# Patient Record
Sex: FEMALE | Race: WHITE | NOT HISPANIC OR LATINO | Employment: UNEMPLOYED | URBAN - METROPOLITAN AREA
[De-identification: names, ages, dates, MRNs, and addresses within clinical notes are randomized per-mention and may not be internally consistent; named-entity substitution may affect disease eponyms.]

---

## 2017-01-27 ENCOUNTER — PROCEDURE VISIT (OUTPATIENT)
Dept: OBSTETRICS AND GYNECOLOGY | Age: 22
End: 2017-01-27

## 2017-01-27 ENCOUNTER — OFFICE VISIT (OUTPATIENT)
Dept: OBSTETRICS AND GYNECOLOGY | Age: 22
End: 2017-01-27

## 2017-01-27 VITALS
BODY MASS INDEX: 20.14 KG/M2 | SYSTOLIC BLOOD PRESSURE: 118 MMHG | WEIGHT: 118 LBS | DIASTOLIC BLOOD PRESSURE: 78 MMHG | HEIGHT: 64 IN

## 2017-01-27 DIAGNOSIS — Z3A.01 7 WEEKS GESTATION OF PREGNANCY: Primary | ICD-10-CM

## 2017-01-27 DIAGNOSIS — Z3A.01 LESS THAN 8 WEEKS GESTATION OF PREGNANCY: Primary | ICD-10-CM

## 2017-01-27 DIAGNOSIS — Z34.81 PRENATAL CARE, SUBSEQUENT PREGNANCY, FIRST TRIMESTER: ICD-10-CM

## 2017-01-27 DIAGNOSIS — O36.80X0 PREGNANCY WITH INCONCLUSIVE FETAL VIABILITY, NOT APPLICABLE OR UNSPECIFIED FETUS: ICD-10-CM

## 2017-01-27 DIAGNOSIS — Z23 NEED FOR IMMUNIZATION AGAINST INFLUENZA: ICD-10-CM

## 2017-01-27 PROBLEM — Z82.79 FAMILY HISTORY OF CONGENITAL HEART DEFECT: Status: ACTIVE | Noted: 2017-01-27

## 2017-01-27 PROBLEM — O34.219 PREVIOUS CESAREAN DELIVERY AFFECTING PREGNANCY: Status: ACTIVE | Noted: 2017-01-27

## 2017-01-27 PROBLEM — Z14.1 CYSTIC FIBROSIS CARRIER: Status: ACTIVE | Noted: 2017-01-27

## 2017-01-27 LAB
EXTERNAL GC/CHLAMYDIA: NORMAL
EXTERNAL RUBELLA QUALITATIVE: (no result)
EXTERNAL URINE CULTURE: NO GROWTH
HIV1 AB SPEC QL IA.RAPID: NEGATIVE
VZV IGG SER QL: NORMAL

## 2017-01-27 PROCEDURE — 90471 IMMUNIZATION ADMIN: CPT | Performed by: OBSTETRICS & GYNECOLOGY

## 2017-01-27 PROCEDURE — 81002 URINALYSIS NONAUTO W/O SCOPE: CPT | Performed by: OBSTETRICS & GYNECOLOGY

## 2017-01-27 PROCEDURE — 76817 TRANSVAGINAL US OBSTETRIC: CPT | Performed by: OBSTETRICS & GYNECOLOGY

## 2017-01-27 PROCEDURE — 90656 IIV3 VACC NO PRSV 0.5 ML IM: CPT | Performed by: OBSTETRICS & GYNECOLOGY

## 2017-01-27 PROCEDURE — 99213 OFFICE O/P EST LOW 20 MIN: CPT | Performed by: OBSTETRICS & GYNECOLOGY

## 2017-01-27 RX ORDER — PNV NO.95/FERROUS FUM/FOLIC AC 28MG-0.8MG
1 TABLET ORAL DAILY
Qty: 30 TABLET | Refills: 12 | Status: SHIPPED | OUTPATIENT
Start: 2017-01-27 | End: 2017-09-10 | Stop reason: HOSPADM

## 2017-01-27 NOTE — PROGRESS NOTES
"Subjective     Chief Complaint   Patient presents with   • Follow-up     pregnancy confirmation       Mary Chandler is a 21 y.o.  whose LMP is Patient's last menstrual period was 2016 (exact date). presents with +pregnancy test, needs prenatal vitamins. No N/V. Plans repeat C/S. Pt is a CF carrier w/ same FOB who was not tested last pregnancy (he does not have insurance). Pt w/ irregular menses.      No Additional Complaints Reported    The following portions of the patient's history were reviewed and updated as appropriate:vital signs, allergies, current medications, past family history, past medical history, past social history, past surgical history and problem list      Review of Systems   Pertinent items are noted in HPI.     Objective        Visit Vitals   • /78   • Ht 64\" (162.6 cm)   • Wt 118 lb (53.5 kg)   • LMP 2016 (Exact Date)   • Breastfeeding No   • BMI 20.25 kg/m2       Physical Exam    General:   alert, appears stated age and mild distress   Heart: regular rate and rhythm, S1, S2 normal, no murmur, click, rub or gallop   Lungs: clear to auscultation bilaterally   Breast: Not performed today   Neck: thyroid not enlarged, symmetric, no tenderness/mass/nodules   Abdomen: {soft, non-tender. Bowel sounds normal. No masses,  no organomegaly   CVA: absent   Pelvis: Not performed today   Extremities: Extremities normal, atraumatic, no cyanosis or edema   Neurologic: negative   Psychiatric: Normal affect, judgement, and mood       Lab Review   Labs: No data reviewed     Imaging   Ultrasound - Pelvic Vaginal  US-pacheco IUP +cardiac activity, CRL=7 1/7wk gest, EDC=17    Assessment/Plan     ASSESSMENT  1. Less than 8 weeks gestation of pregnancy    2. Need for immunization against influenza        PLAN  1.   Orders Placed This Encounter   Procedures   • Urine Culture   • C Trachomatis / N Gonorrhoeae PCR   • Flu Vaccine Greater Than or Equal To 2yo Preservative Free IM   • OB " Panel With HIV       2. Medications prescribed this encounter:        New Medications Ordered This Visit   Medications   • Prenatal Vit-Fe Fumarate-FA (PRENATAL VITAMIN) 27-0.8 MG tablet     Sig: Take 1 tablet by mouth Daily.     Dispense:  30 tablet     Refill:  12       3. Use US EDC of 9/14/17  4 will refer to peds cardiology for fetal echo at 24 wks  5 desires cell free DNA and Essential panel w/ NxGen, info given  Follow up: 4 week(s)    Marine Peterson MD  1/27/2017

## 2017-01-27 NOTE — MR AVS SNAPSHOT
Mary Chandler   2017 8:15 AM   Procedure visit    Dept Phone:  [phone]   Encounter #:  [id number]    Provider:  [facility]   Department:  [facility]                Your Full Care Plan              Your Updated Medication List      Notice  As of 2017  9:10 AM    You have not been prescribed any medications.            We Performed the Following     US Ob Transvaginal       You Were Diagnosed With        Codes Comments    7 weeks gestation of pregnancy    -  Primary ICD-10-CM: Z3A.01  ICD-9-CM: V22.2     Pregnancy with inconclusive fetal viability, not applicable or unspecified fetus     ICD-10-CM: O36.80X0  ICD-9-CM: V23.87     Prenatal care, subsequent pregnancy, first trimester     ICD-10-CM: Z34.81  ICD-9-CM: V22.1       Instructions     None    Patient Instructions History      Upcoming Appointments     Visit Type Date Time Department    GYN FOLLOW UP 2017  8:30 AM [facility]    ULTRASOUND 2017  8:15 AM [facility]    OB FOLLOWUP 2017 10:45 AM [facility]      MyChart Signup     [facility] [employer] allows you to send messages to your doctor, view your test results, renew your prescriptions, schedule appointments, and more. To sign up, go to [email or web address] and click on the Sign Up Now link in the New User? box. Enter your [employer] Activation Code exactly as it appears below along with the last four digits of your Social Security Number and your Date of Birth () to complete the sign-up process. If you do not sign up before the expiration date, you must request a new code.    [employer] Activation Code: [email or web address]  Expires: 2/10/2017  9:10 AM    If you have questions, you can email [email or web address] or call [phone] to talk to our [employer] staff. Remember, [employer] is NOT to be used for urgent needs. For medical emergencies, dial 911.               Other Info  from Your Visit           Your Appointments     Feb 24, 2017 10:45 AM EST   OB FOLLOWUP with Marine Peterson MD   [facility] (--)    [address] [place] [address]   [phone]              Allergies     No Known Allergies      Vital Signs     Last Menstrual Period Smoking Status                12/03/2016 (Exact Date) Never Smoker          Problems and Diagnoses Noted     7 weeks gestation of pregnancy    -  Primary    Pregnancy with inconclusive fetal viability, not applicable or unspecified fetus        Prenatal care

## 2017-01-27 NOTE — MR AVS SNAPSHOT
Mary Lainezy   2017 8:30 AM   Office Visit    Dept Phone:  [phone]   Encounter #:  [id number]    Provider:  Marine Peterson MD   Department:  [facility]                Your Full Care Plan              Your Updated Medication List      Notice  As of 2017  9:10 AM    You have not been prescribed any medications.            We Performed the Following     C Trachomatis / N Gonorrhoeae PCR     Flu Vaccine Greater Than or Equal To 4yo Preservative Free IM     OB Panel With HIV     Urine Culture       You Were Diagnosed With        Codes Comments    Less than 8 weeks gestation of pregnancy    -  Primary ICD-10-CM: Z3A.01  ICD-9-CM: V22.2     Need for immunization against influenza     ICD-10-CM: Z23  ICD-9-CM: V04.81       Instructions     None    Patient Instructions History      Upcoming Appointments     Visit Type Date Time Department    GYN FOLLOW UP 2017  8:30 AM [facility]    ULTRASOUND 2017  8:15 AM [facility]    [facility] 2017 10:45 AM [facility]      MyChart Signup     [facility] [email or web address] allows you to send messages to your doctor, view your test results, renew your prescriptions, schedule appointments, and more. To sign up, go to [employer] and click on the Sign Up Now link in the New User? box. Enter your [email or web address] Activation Code exactly as it appears below along with the last four digits of your Social Security Number and your Date of Birth () to complete the sign-up process. If you do not sign up before the expiration date, you must request a new code.    [email or web address] Activation Code: [email or web address]  Expires: 2/10/2017  9:10 AM    If you have questions, you can email [email or web address] or call [phone] to talk to our [email or web address] staff. Remember, [email or web address] is NOT to be used for urgent needs. For medical emergencies, dial 911.               Other Info from Your Visit    "       Your Appointments     Feb 24, 2017 10:45 AM EST   OB FOLLOWUP with Marine Peterson MD   [facility] (--)    [address] [facility] [address] [phone]              Allergies     No Known Allergies      Reason for Visit     Follow-up pregnancy confirmation      Vital Signs     Blood Pressure Height Weight Last Menstrual Period Breastfeeding? Body Mass Index    118/78 (80 %/ 91 %)* 64\" (162.6 cm) (45 %, Z= -0.11)† 118 lb (53.5 kg) (31 %, Z= -0.50)† 12/03/2016 (Exact Date) No 20.25 kg/m2 (32 %, Z= -0.48)†    Smoking Status                   Never Smoker         *BP percentiles are based on NHBPEP's 4th Report    †Growth percentiles are based on CDC 2-20 Years data.      Problems and Diagnoses Noted     Less than 8 weeks gestation of pregnancy    -  Primary    Needs flu shot            "

## 2017-01-28 LAB
ABO GROUP BLD: (no result)
BASOPHILS # BLD AUTO: 0 X10E3/UL (ref 0–0.2)
BASOPHILS NFR BLD AUTO: 0 %
BLD GP AB SCN SERPL QL: NEGATIVE
EOSINOPHIL # BLD AUTO: 0.2 X10E3/UL (ref 0–0.4)
EOSINOPHIL NFR BLD AUTO: 4 %
ERYTHROCYTE [DISTWIDTH] IN BLOOD BY AUTOMATED COUNT: 13 % (ref 12.3–15.4)
HBV SURFACE AG SERPL QL IA: NEGATIVE
HCT VFR BLD AUTO: 39.4 % (ref 34–46.6)
HGB BLD-MCNC: 13.1 G/DL (ref 11.1–15.9)
HIV 1+2 AB+HIV1 P24 AG SERPL QL IA: NON REACTIVE
IMM GRANULOCYTES # BLD: 0 X10E3/UL (ref 0–0.1)
IMM GRANULOCYTES NFR BLD: 0 %
LYMPHOCYTES # BLD AUTO: 1.8 X10E3/UL (ref 0.7–3.1)
LYMPHOCYTES NFR BLD AUTO: 30 %
MCH RBC QN AUTO: 30 PG (ref 26.6–33)
MCHC RBC AUTO-ENTMCNC: 33.2 G/DL (ref 31.5–35.7)
MCV RBC AUTO: 90 FL (ref 79–97)
MONOCYTES # BLD AUTO: 0.3 X10E3/UL (ref 0.1–0.9)
MONOCYTES NFR BLD AUTO: 6 %
NEUTROPHILS # BLD AUTO: 3.5 X10E3/UL (ref 1.4–7)
NEUTROPHILS NFR BLD AUTO: 60 %
PLATELET # BLD AUTO: 260 X10E3/UL (ref 150–379)
RBC # BLD AUTO: 4.37 X10E6/UL (ref 3.77–5.28)
RH BLD: POSITIVE
RPR SER QL: NON REACTIVE
RUBV IGG SERPL IA-ACNC: 2.83 INDEX
WBC # BLD AUTO: 5.9 X10E3/UL (ref 3.4–10.8)

## 2017-01-29 LAB
BACTERIA UR CULT: NO GROWTH
BACTERIA UR CULT: NORMAL

## 2017-01-31 LAB
C TRACH RRNA SPEC QL NAA+PROBE: POSITIVE
N GONORRHOEA RRNA SPEC QL NAA+PROBE: NEGATIVE

## 2017-02-01 ENCOUNTER — TELEPHONE (OUTPATIENT)
Dept: OBSTETRICS AND GYNECOLOGY | Age: 22
End: 2017-02-01

## 2017-02-01 DIAGNOSIS — A74.9 CHLAMYDIA INFECTION AFFECTING PREGNANCY IN FIRST TRIMESTER, ANTEPARTUM: Primary | ICD-10-CM

## 2017-02-01 DIAGNOSIS — O98.811 CHLAMYDIA INFECTION AFFECTING PREGNANCY IN FIRST TRIMESTER, ANTEPARTUM: Primary | ICD-10-CM

## 2017-02-01 RX ORDER — AZITHROMYCIN 500 MG/1
1000 TABLET, FILM COATED ORAL DAILY
Qty: 2 TABLET | Refills: 0 | Status: SHIPPED | OUTPATIENT
Start: 2017-02-01 | End: 2017-02-02

## 2017-02-24 ENCOUNTER — ROUTINE PRENATAL (OUTPATIENT)
Dept: OBSTETRICS AND GYNECOLOGY | Age: 22
End: 2017-02-24

## 2017-02-24 VITALS — BODY MASS INDEX: 20.6 KG/M2 | DIASTOLIC BLOOD PRESSURE: 68 MMHG | SYSTOLIC BLOOD PRESSURE: 102 MMHG | WEIGHT: 120 LBS

## 2017-02-24 DIAGNOSIS — Z3A.11 11 WEEKS GESTATION OF PREGNANCY: Primary | ICD-10-CM

## 2017-02-24 LAB
EXTERNAL CYSTIC FIBROSIS: NORMAL
EXTERNAL NIPT: NORMAL

## 2017-02-24 PROCEDURE — 99213 OFFICE O/P EST LOW 20 MIN: CPT | Performed by: OBSTETRICS & GYNECOLOGY

## 2017-02-24 RX ORDER — SWAB
SWAB, NON-MEDICATED MISCELLANEOUS
Refills: 12 | COMMUNITY
Start: 2017-01-27 | End: 2017-09-20

## 2017-02-24 NOTE — PROGRESS NOTES
No c/o, +Chlamydia at new OB apt, s/p tx w/ Azithr, recheck next apt, cell free DNA and Essential panel w/ NxGen MDx, family h/o congenital heart defects, planning Peds cardiology ECHO at 24 wks, pt know to be CF carrier, but will do extended panel (Essential Panel), flu shot at first visit

## 2017-03-13 ENCOUNTER — ROUTINE PRENATAL (OUTPATIENT)
Dept: OBSTETRICS AND GYNECOLOGY | Age: 22
End: 2017-03-13

## 2017-03-13 ENCOUNTER — PROCEDURE VISIT (OUTPATIENT)
Dept: OBSTETRICS AND GYNECOLOGY | Age: 22
End: 2017-03-13

## 2017-03-13 VITALS — BODY MASS INDEX: 21.11 KG/M2 | SYSTOLIC BLOOD PRESSURE: 112 MMHG | DIASTOLIC BLOOD PRESSURE: 72 MMHG | WEIGHT: 123 LBS

## 2017-03-13 DIAGNOSIS — O98.819 CHLAMYDIA INFECTION AFFECTING PREGNANCY: ICD-10-CM

## 2017-03-13 DIAGNOSIS — Z3A.13 13 WEEKS GESTATION OF PREGNANCY: Primary | ICD-10-CM

## 2017-03-13 DIAGNOSIS — O23.599: ICD-10-CM

## 2017-03-13 DIAGNOSIS — A74.9 CHLAMYDIA INFECTION AFFECTING PREGNANCY: ICD-10-CM

## 2017-03-13 DIAGNOSIS — O26.851 SPOTTING AFFECTING PREGNANCY IN FIRST TRIMESTER: Primary | ICD-10-CM

## 2017-03-13 DIAGNOSIS — N76.0: ICD-10-CM

## 2017-03-13 PROCEDURE — 99212 OFFICE O/P EST SF 10 MIN: CPT | Performed by: PHYSICIAN ASSISTANT

## 2017-03-13 PROCEDURE — 76817 TRANSVAGINAL US OBSTETRIC: CPT | Performed by: OBSTETRICS & GYNECOLOGY

## 2017-03-13 PROCEDURE — 81002 URINALYSIS NONAUTO W/O SCOPE: CPT | Performed by: PHYSICIAN ASSISTANT

## 2017-03-13 NOTE — PROGRESS NOTES
Pt called Dr price on call and told her she thought she had lost her mucus plug.  Not having any other issues.  Denies bleeding. Cervical culture done, + spotting noted after using the q tip to obtain the specimen.  CL done and is approximately 4 cm.  Placenta is near to cervical opening, approx .5 cm away.  Good FM noted during u/s.  Disc pelvic rest with pt until next visit.

## 2017-03-15 LAB
C TRACH RRNA SPEC QL NAA+PROBE: NEGATIVE
N GONORRHOEA RRNA SPEC QL NAA+PROBE: NEGATIVE
T VAGINALIS RRNA SPEC QL NAA+PROBE: NEGATIVE

## 2017-03-20 ENCOUNTER — TELEPHONE (OUTPATIENT)
Dept: OBSTETRICS AND GYNECOLOGY | Facility: HOSPITAL | Age: 22
End: 2017-03-20

## 2017-03-20 NOTE — TELEPHONE ENCOUNTER
Spoke w/ pt, Essential panel negative for SMA and fragile X, but postive for cystic fibrosis ( pt already aware of status from last pregnancy testing), recommend FOB get testing, can send from our office. Pt declines phone number for genetic counseling w/ Judy (free service).

## 2017-03-23 ENCOUNTER — ROUTINE PRENATAL (OUTPATIENT)
Dept: OBSTETRICS AND GYNECOLOGY | Age: 22
End: 2017-03-23

## 2017-03-23 VITALS — SYSTOLIC BLOOD PRESSURE: 122 MMHG | WEIGHT: 124 LBS | DIASTOLIC BLOOD PRESSURE: 70 MMHG | BODY MASS INDEX: 21.28 KG/M2

## 2017-03-23 DIAGNOSIS — A74.9 CHLAMYDIA INFECTION AFFECTING PREGNANCY: ICD-10-CM

## 2017-03-23 DIAGNOSIS — O98.819 CHLAMYDIA INFECTION AFFECTING PREGNANCY: ICD-10-CM

## 2017-03-23 DIAGNOSIS — Z3A.15 15 WEEKS GESTATION OF PREGNANCY: Primary | ICD-10-CM

## 2017-03-23 PROCEDURE — 99213 OFFICE O/P EST LOW 20 MIN: CPT | Performed by: OBSTETRICS & GYNECOLOGY

## 2017-03-23 NOTE — PROGRESS NOTES
Pregnancy at 15 wks  No c/o, recheck Chlamydia today (GC/Chl urine sent), pt CF carrier, FOB refuses testing (afraid of needles) pt declines genetic counseling, understands that if both are carriers, 1 in 4 children would be affected by CF. Essential panel negative for fragile X and SMA.  Cell free dna wnl, male fetus. 3/13/17 GC/Chl/trich negative. Pt needs to have primary MD refer her for fetal ECHO w/ peds cardiology due to family h/o congenital heart abn (per [employer]port rules)

## 2017-03-25 LAB
C TRACH RRNA SPEC QL NAA+PROBE: NEGATIVE
N GONORRHOEA RRNA SPEC QL NAA+PROBE: NEGATIVE

## 2017-03-27 ENCOUNTER — TELEPHONE (OUTPATIENT)
Dept: OBSTETRICS AND GYNECOLOGY | Age: 22
End: 2017-03-27

## 2017-03-30 ENCOUNTER — TELEPHONE (OUTPATIENT)
Dept: OBSTETRICS AND GYNECOLOGY | Age: 22
End: 2017-03-30

## 2017-04-20 ENCOUNTER — ROUTINE PRENATAL (OUTPATIENT)
Dept: OBSTETRICS AND GYNECOLOGY | Age: 22
End: 2017-04-20

## 2017-04-20 VITALS — WEIGHT: 124.8 LBS | SYSTOLIC BLOOD PRESSURE: 110 MMHG | BODY MASS INDEX: 21.42 KG/M2 | DIASTOLIC BLOOD PRESSURE: 70 MMHG

## 2017-04-20 DIAGNOSIS — Z3A.19 19 WEEKS GESTATION OF PREGNANCY: Primary | ICD-10-CM

## 2017-04-20 PROCEDURE — 99213 OFFICE O/P EST LOW 20 MIN: CPT | Performed by: OBSTETRICS & GYNECOLOGY

## 2017-04-20 NOTE — PROGRESS NOTES
Pregnancy at 19 wk  No c/o, pts mom will set up fetal ECHO w/ Dr Irving (Peds cardiology) due to family h/o congenital heart disease, plan US tal and cervical length in 2 wks, GC/Chl negative 3/23/17

## 2017-05-03 ENCOUNTER — PROCEDURE VISIT (OUTPATIENT)
Dept: OBSTETRICS AND GYNECOLOGY | Age: 22
End: 2017-05-03

## 2017-05-03 ENCOUNTER — ROUTINE PRENATAL (OUTPATIENT)
Dept: OBSTETRICS AND GYNECOLOGY | Age: 22
End: 2017-05-03

## 2017-05-03 VITALS — DIASTOLIC BLOOD PRESSURE: 68 MMHG | BODY MASS INDEX: 21.8 KG/M2 | WEIGHT: 127 LBS | SYSTOLIC BLOOD PRESSURE: 112 MMHG

## 2017-05-03 DIAGNOSIS — Z3A.20 20 WEEKS GESTATION OF PREGNANCY: Primary | ICD-10-CM

## 2017-05-03 DIAGNOSIS — Z03.75 SUSPECTED CERVICAL SHORTENING NOT FOUND: ICD-10-CM

## 2017-05-03 DIAGNOSIS — Z36.89 SCREENING, ANTENATAL, FOR FETAL ANATOMIC SURVEY: ICD-10-CM

## 2017-05-03 PROCEDURE — 76805 OB US >/= 14 WKS SNGL FETUS: CPT | Performed by: OBSTETRICS & GYNECOLOGY

## 2017-05-03 PROCEDURE — 99213 OFFICE O/P EST LOW 20 MIN: CPT | Performed by: OBSTETRICS & GYNECOLOGY

## 2017-05-03 PROCEDURE — 76817 TRANSVAGINAL US OBSTETRIC: CPT | Performed by: OBSTETRICS & GYNECOLOGY

## 2017-05-31 ENCOUNTER — ROUTINE PRENATAL (OUTPATIENT)
Dept: OBSTETRICS AND GYNECOLOGY | Age: 22
End: 2017-05-31

## 2017-05-31 VITALS — DIASTOLIC BLOOD PRESSURE: 70 MMHG | BODY MASS INDEX: 22.31 KG/M2 | WEIGHT: 130 LBS | SYSTOLIC BLOOD PRESSURE: 110 MMHG

## 2017-05-31 DIAGNOSIS — O34.219 PREVIOUS CESAREAN DELIVERY AFFECTING PREGNANCY: ICD-10-CM

## 2017-05-31 DIAGNOSIS — Z3A.24 24 WEEKS GESTATION OF PREGNANCY: Primary | ICD-10-CM

## 2017-05-31 DIAGNOSIS — Z13.1 SCREENING FOR DIABETES MELLITUS: ICD-10-CM

## 2017-05-31 DIAGNOSIS — Z13.0 SCREENING FOR IRON DEFICIENCY ANEMIA: ICD-10-CM

## 2017-05-31 PROCEDURE — 99213 OFFICE O/P EST LOW 20 MIN: CPT | Performed by: OBSTETRICS & GYNECOLOGY

## 2017-06-01 LAB
BASOPHILS # BLD AUTO: 0.01 10*3/MM3 (ref 0–0.2)
BASOPHILS NFR BLD AUTO: 0.1 % (ref 0–1.5)
EOSINOPHIL # BLD AUTO: 0.13 10*3/MM3 (ref 0–0.7)
EOSINOPHIL NFR BLD AUTO: 1.4 % (ref 0.3–6.2)
ERYTHROCYTE [DISTWIDTH] IN BLOOD BY AUTOMATED COUNT: 13.7 % (ref 11.7–13)
GLUCOSE 1H P 50 G GLC PO SERPL-MCNC: 99 MG/DL (ref 65–139)
HCT VFR BLD AUTO: 37.3 % (ref 35.6–45.5)
HGB BLD-MCNC: 12.2 G/DL (ref 11.9–15.5)
IMM GRANULOCYTES # BLD: 0.03 10*3/MM3 (ref 0–0.03)
IMM GRANULOCYTES NFR BLD: 0.3 % (ref 0–0.5)
LYMPHOCYTES # BLD AUTO: 1.72 10*3/MM3 (ref 0.9–4.8)
LYMPHOCYTES NFR BLD AUTO: 18.7 % (ref 19.6–45.3)
MCH RBC QN AUTO: 31.3 PG (ref 26.9–32)
MCHC RBC AUTO-ENTMCNC: 32.7 G/DL (ref 32.4–36.3)
MCV RBC AUTO: 95.6 FL (ref 80.5–98.2)
MONOCYTES # BLD AUTO: 0.57 10*3/MM3 (ref 0.2–1.2)
MONOCYTES NFR BLD AUTO: 6.2 % (ref 5–12)
NEUTROPHILS # BLD AUTO: 6.73 10*3/MM3 (ref 1.9–8.1)
NEUTROPHILS NFR BLD AUTO: 73.3 % (ref 42.7–76)
PLATELET # BLD AUTO: 236 10*3/MM3 (ref 140–500)
RBC # BLD AUTO: 3.9 10*6/MM3 (ref 3.9–5.2)
WBC # BLD AUTO: 9.19 10*3/MM3 (ref 4.5–10.7)

## 2017-06-02 ENCOUNTER — TELEPHONE (OUTPATIENT)
Dept: OBSTETRICS AND GYNECOLOGY | Age: 22
End: 2017-06-02

## 2017-06-02 NOTE — TELEPHONE ENCOUNTER
----- Message from Marine Peterson MD sent at 6/1/2017  9:58 AM EDT -----  Glucose normal, hemoglobin and plts normal, please call w/ results

## 2017-06-17 ENCOUNTER — HOSPITAL ENCOUNTER (OUTPATIENT)
Facility: HOSPITAL | Age: 22
Setting detail: OBSERVATION
Discharge: HOME | End: 2017-06-17
Attending: OBSTETRICS & GYNECOLOGY | Admitting: OBSTETRICS & GYNECOLOGY

## 2017-06-17 VITALS
OXYGEN SATURATION: 81 % | BODY MASS INDEX: 22.2 KG/M2 | RESPIRATION RATE: 18 BRPM | TEMPERATURE: 98 F | WEIGHT: 130 LBS | HEIGHT: 64 IN | HEART RATE: 229 BPM | DIASTOLIC BLOOD PRESSURE: 65 MMHG | SYSTOLIC BLOOD PRESSURE: 112 MMHG

## 2017-06-17 PROBLEM — Z34.90 PREGNANCY: Status: ACTIVE | Noted: 2017-06-17

## 2017-06-17 LAB
BACTERIA UR QL AUTO: ABNORMAL /HPF
BASOPHILS # BLD AUTO: 0.01 10*3/MM3 (ref 0–0.2)
BASOPHILS NFR BLD AUTO: 0.1 % (ref 0–1.5)
BILIRUB UR QL STRIP: NEGATIVE
CLARITY UR: ABNORMAL
COLOR UR: YELLOW
DEPRECATED RDW RBC AUTO: 41.1 FL (ref 37–54)
EOSINOPHIL # BLD AUTO: 0.16 10*3/MM3 (ref 0–0.7)
EOSINOPHIL NFR BLD AUTO: 1.5 % (ref 0.3–6.2)
ERYTHROCYTE [DISTWIDTH] IN BLOOD BY AUTOMATED COUNT: 12.8 % (ref 11.7–13)
GLUCOSE UR STRIP-MCNC: NEGATIVE MG/DL
HCT VFR BLD AUTO: 34.2 % (ref 35.6–45.5)
HGB BLD-MCNC: 12 G/DL (ref 11.9–15.5)
HGB UR QL STRIP.AUTO: NEGATIVE
HYALINE CASTS UR QL AUTO: ABNORMAL /LPF
IMM GRANULOCYTES # BLD: 0.03 10*3/MM3 (ref 0–0.03)
IMM GRANULOCYTES NFR BLD: 0.3 % (ref 0–0.5)
KETONES UR QL STRIP: NEGATIVE
LEUKOCYTE ESTERASE UR QL STRIP.AUTO: ABNORMAL
LYMPHOCYTES # BLD AUTO: 2.45 10*3/MM3 (ref 0.9–4.8)
LYMPHOCYTES NFR BLD AUTO: 22.5 % (ref 19.6–45.3)
MCH RBC QN AUTO: 30.9 PG (ref 26.9–32)
MCHC RBC AUTO-ENTMCNC: 35.1 G/DL (ref 32.4–36.3)
MCV RBC AUTO: 88.1 FL (ref 80.5–98.2)
MONOCYTES # BLD AUTO: 0.67 10*3/MM3 (ref 0.2–1.2)
MONOCYTES NFR BLD AUTO: 6.2 % (ref 5–12)
NEUTROPHILS # BLD AUTO: 7.56 10*3/MM3 (ref 1.9–8.1)
NEUTROPHILS NFR BLD AUTO: 69.4 % (ref 42.7–76)
NITRITE UR QL STRIP: NEGATIVE
PH UR STRIP.AUTO: 6.5 [PH] (ref 5–8)
PLATELET # BLD AUTO: 230 10*3/MM3 (ref 140–500)
PMV BLD AUTO: 10.5 FL (ref 6–12)
PROT UR QL STRIP: NEGATIVE
RBC # BLD AUTO: 3.88 10*6/MM3 (ref 3.9–5.2)
RBC # UR: ABNORMAL /HPF
REF LAB TEST METHOD: ABNORMAL
SP GR UR STRIP: 1.01 (ref 1–1.03)
SQUAMOUS #/AREA URNS HPF: ABNORMAL /HPF
UROBILINOGEN UR QL STRIP: ABNORMAL
WBC NRBC COR # BLD: 10.88 10*3/MM3 (ref 4.5–10.7)
WBC UR QL AUTO: ABNORMAL /HPF

## 2017-06-17 PROCEDURE — 85025 COMPLETE CBC W/AUTO DIFF WBC: CPT | Performed by: OBSTETRICS & GYNECOLOGY

## 2017-06-17 PROCEDURE — 87086 URINE CULTURE/COLONY COUNT: CPT | Performed by: OBSTETRICS & GYNECOLOGY

## 2017-06-17 PROCEDURE — 59025 FETAL NON-STRESS TEST: CPT | Performed by: OBSTETRICS & GYNECOLOGY

## 2017-06-17 PROCEDURE — 81001 URINALYSIS AUTO W/SCOPE: CPT | Performed by: OBSTETRICS & GYNECOLOGY

## 2017-06-17 PROCEDURE — G0378 HOSPITAL OBSERVATION PER HR: HCPCS

## 2017-06-17 PROCEDURE — 59025 FETAL NON-STRESS TEST: CPT

## 2017-06-17 PROCEDURE — 99213 OFFICE O/P EST LOW 20 MIN: CPT | Performed by: OBSTETRICS & GYNECOLOGY

## 2017-06-17 NOTE — PROGRESS NOTES
ANTEPARTUM labor delivery.    Admission date 2017     Patient: Mary Chandler MRN: [id number]   YOB: 1995 Age: 21 y.o. Sex: female     Chief Complaint   Patient presents with   • Abdominal Cramping     Pt. c/o intermittent abd cramping x 2-3 days. Pt. unable to differentiate b/t uterine, rectal, or bladder crammping. C/O urinary frequency & urgency       SUBJECTIVE:     Mary Chandler is a 21 y.o.,  AT 27w2d admitted for Valuation of vague mid and occasionally lower abdominal discomfort associated with bowel movements.. Denies vaginal bleeding, leakage of fluid, or contractions. Admits to good fetal movement.  Patient states she has been afebrile, has dealt with a little bit of reflux in the past several months but noticed some vague nondescript abdominal discomfort the past 2-3 days.  She was uncertain whether she had some type of virus had some loose stools and then a little bit more well formed over the past several days.  While she states she has been feeling pretty well.  Although she did not admit to it initially upon direct questioning she says she occasionally has some discomfort when she urinates.      Past Medical History:   Diagnosis Date   • Asthma    • Ovarian cyst      Past Surgical History:   Procedure Laterality Date   • ADENOIDECTOMY     • APPENDECTOMY     •  SECTION N/A 3/16/2016    Procedure:  SECTION PRIMARY;  Surgeon: Marine Peterson MD;  Location: [facility] LABOR DELIVERY;  Service:    • TONSILLECTOMY     • WISDOM TOOTH EXTRACTION       No current facility-administered medications on file prior to encounter.      Current Outpatient Prescriptions on File Prior to Encounter   Medication Sig Dispense Refill   • Prenatal Vit-Fe Fumarate-FA (PRENATAL VITAMIN) 27-0.8 MG tablet Take 1 tablet by mouth Daily. 30 tablet 12   • PRENATAL 28-0.8 MG tablet TK 1 T PO D  12       ROS:      Except as outlined in history of physical illness, patient denies any changes in  "her GYN, , GI systems. All other systems reviewed are negative.      OBJECTIVE:     Vitals:   Vitals:    06/17/17 1845 06/17/17 1853 06/17/17 1913 06/17/17 1917   BP:   117/72    BP Location: Right arm      Patient Position: Lying      Pulse: 84   86   Resp: 18      Temp: 98 °F (36.7 °C)      TempSrc: Oral      SpO2: 98%   98%   Weight:  130 lb (59 kg)     Height:  64\" (162.6 cm)         Intake/Output: No intake or output data in the 24 hours ending 06/17/17 1933     Lab Results (last 24 hours)     Procedure Component Value Units Date/Time    Urine Culture [[id number]] Collected:  06/17/17 1906    Specimen:  Urine from Urine, Clean Catch Updated:  06/17/17 1917    CBC & Differential [[id number]] Collected:  06/17/17 1906    Specimen:  Blood Updated:  06/17/17 1918    Narrative:       The following orders were created for panel order CBC & Differential.  Procedure                               Abnormality         Status                     ---------                               -----------         ------                     CBC Auto Differential[[id number]]        Abnormal            Final result                 Please view results for these tests on the individual orders.    CBC Auto Differential [[id number]]  (Abnormal) Collected:  06/17/17 1906    Specimen:  Blood Updated:  06/17/17 1918     WBC 10.88 (H) 10*3/mm3      RBC 3.88 (L) 10*6/mm3      Hemoglobin 12.0 g/dL      Hematocrit 34.2 (L) %      MCV 88.1 fL      MCH 30.9 pg      MCHC 35.1 g/dL      RDW 12.8 %      RDW-SD 41.1 fl      MPV 10.5 fL      Platelets 230 10*3/mm3      Neutrophil % 69.4 %      Lymphocyte % 22.5 %      Monocyte % 6.2 %      Eosinophil % 1.5 %      Basophil % 0.1 %      Immature Grans % 0.3 %      Neutrophils, Absolute 7.56 10*3/mm3      Lymphocytes, Absolute 2.45 10*3/mm3      Monocytes, Absolute 0.67 10*3/mm3      Eosinophils, Absolute 0.16 10*3/mm3      Basophils, Absolute 0.01 10*3/mm3      Immature Grans, Absolute 0.03 10*3/mm3     " Urinalysis With / Culture If Indicated [[id number]]  (Abnormal) Collected:  17    Specimen:  Urine from Urine, Clean Catch Updated:  17     Color, UA Yellow     Appearance, UA Cloudy (A)     pH, UA 6.5     Specific Gravity, UA 1.014     Glucose, UA Negative     Ketones, UA Negative     Bilirubin, UA Negative     Blood, UA Negative     Protein, UA Negative     Leuk Esterase, UA Small (1+) (A)     Nitrite, UA Negative     Urobilinogen, UA 0.2 E.U./dL    Urinalysis, Microscopic Only [[id number]]  (Abnormal) Collected:  17    Specimen:  Urine from Urine, Clean Catch Updated:  17     RBC, UA 0-2 /HPF      WBC, UA 13-20 (A) /HPF      Bacteria, UA 4+ (A) /HPF      Squamous Epithelial Cells, UA 7-12 (A) /HPF      Hyaline Casts, UA 7-12 /LPF      Methodology Automated Microscopy           Appearance/Psychiatric: In no distress   Constitutional: The patient is well nourished   Cardiovascular: She does not have edema. Heart RRR  Respiratory: Respiratory effort is normal. CTAB   Abdomen: Soft, gravid.No contractions are palpable.  Ext: nontender, no edema. +2/4 bilateral patellar reflexes   Cx; deferred  ASSESSMENT AND PLAN:   Patient Active Problem List    Diagnosis   • Pregnancy [Z33.1]   • Chlamydia infection affecting pregnancy in first trimester, antepartum [O98.311, A74.9]   • Family history of congenital heart defect [Z82.79]   • Previous  delivery affecting pregnancy [O34.219]   • Cystic fibrosis carrier [Z14.1]   As outlined in history of present illness, nonspecific abdominal discomfort and and occasional urinary urgency and frequency.  We'll check CBC urinalysis monitor and orally hydrate.  Patient is in no distress at present    LOS: 0 days    Sagar Pritchard MD   2017    EMR Dragon/ Transcription disclaimer:  Much of the encounter note is an electronic transcription/translation of spoken language to printed text. The electronic translation of spoken language  may permit erroneous, or at times, nonessential words or phrases to be inadvertently transcribes; Although i have reviewed the note for such errors, some may still exist.

## 2017-06-18 NOTE — NON STRESS TEST
Mary Chandler, a  at 27w2d with an FIOR of 2017, by Other [employer]asis, was seen at [facility] LABOR DELIVERY for a nonstress test.    Chief Complaint   Patient presents with   • Abdominal Cramping     Pt. c/o intermittent abd cramping x 2-3 days. Pt. unable to differentiate b/t uterine, rectal, or bladder crammping. C/O urinary frequency & urgency       Interpretation A  Nonstress Test Interpretation A: Reactive (17 : Bernadette Garibay RN)

## 2017-06-19 LAB — BACTERIA SPEC AEROBE CULT: NO GROWTH

## 2017-06-20 ENCOUNTER — TELEPHONE (OUTPATIENT)
Dept: OBSTETRICS AND GYNECOLOGY | Age: 22
End: 2017-06-20

## 2017-06-20 NOTE — TELEPHONE ENCOUNTER
----- Message from Marine Peterson MD sent at 6/18/2017 12:21 PM EDT -----  Urine culture negative

## 2017-06-23 ENCOUNTER — ROUTINE PRENATAL (OUTPATIENT)
Dept: OBSTETRICS AND GYNECOLOGY | Age: 22
End: 2017-06-23

## 2017-06-23 VITALS — WEIGHT: 133 LBS | BODY MASS INDEX: 22.83 KG/M2 | SYSTOLIC BLOOD PRESSURE: 112 MMHG | DIASTOLIC BLOOD PRESSURE: 70 MMHG

## 2017-06-23 DIAGNOSIS — Z23 NEED FOR PROPHYLACTIC VACCINATION WITH COMBINED DIPHTHERIA-TETANUS-PERTUSSIS (DTP) VACCINE: ICD-10-CM

## 2017-06-23 DIAGNOSIS — Z3A.28 28 WEEKS GESTATION OF PREGNANCY: Primary | ICD-10-CM

## 2017-06-23 PROCEDURE — 99213 OFFICE O/P EST LOW 20 MIN: CPT | Performed by: OBSTETRICS & GYNECOLOGY

## 2017-06-23 PROCEDURE — 90715 TDAP VACCINE 7 YRS/> IM: CPT | Performed by: OBSTETRICS & GYNECOLOGY

## 2017-06-23 PROCEDURE — 90471 IMMUNIZATION ADMIN: CPT | Performed by: OBSTETRICS & GYNECOLOGY

## 2017-06-23 NOTE — PROGRESS NOTES
Pregnancy at 28 1/7wk gest  C/o some tightening of abdomen, ?BH contractions, desires sterilization w/ C/S  Seen on L&D 6/17/17 w/ diarrhea and abd cramping, Dx w/ UTI and prescribed antibiotics, but urine culture negative  Now contractions/tightening better  Abd-soft gravid NT  Extr NT  A/P pregnancy at 28 wks  H/o IUGR, plan US growth next apt  t-dap today  Urine culture negative, therefor no UTI, can dc antibiotics  Unable to sterilize pt at current age, too young for permanent sterilization, recommend Nexplanon, or IUD, info given

## 2017-07-12 ENCOUNTER — PROCEDURE VISIT (OUTPATIENT)
Dept: OBSTETRICS AND GYNECOLOGY | Age: 22
End: 2017-07-12

## 2017-07-12 ENCOUNTER — ROUTINE PRENATAL (OUTPATIENT)
Dept: OBSTETRICS AND GYNECOLOGY | Age: 22
End: 2017-07-12

## 2017-07-12 VITALS — DIASTOLIC BLOOD PRESSURE: 70 MMHG | BODY MASS INDEX: 23 KG/M2 | WEIGHT: 134 LBS | SYSTOLIC BLOOD PRESSURE: 112 MMHG

## 2017-07-12 DIAGNOSIS — Z3A.30 30 WEEKS GESTATION OF PREGNANCY: Primary | ICD-10-CM

## 2017-07-12 DIAGNOSIS — Z36.89 ULTRASOUND SCAN TO CHECK INTERVAL GROWTH OF FETUS: Primary | ICD-10-CM

## 2017-07-12 DIAGNOSIS — Z3A.30 30 WEEKS GESTATION OF PREGNANCY: ICD-10-CM

## 2017-07-12 PROCEDURE — 76816 OB US FOLLOW-UP PER FETUS: CPT | Performed by: OBSTETRICS & GYNECOLOGY

## 2017-07-12 PROCEDURE — 99213 OFFICE O/P EST LOW 20 MIN: CPT | Performed by: OBSTETRICS & GYNECOLOGY

## 2017-07-12 NOTE — PROGRESS NOTES
Pregnancy at 30 6/7wk gest  No c/o, occasional BH contractions  US wt 63.9% AC=15% FL>99% HARIS=13  Abd-soft gravid NT  extr NT  A/P pregnancy at 30 6/7wk gest  H/o IUGR, adequate growth by US today  F/u 2 wks

## 2017-07-31 ENCOUNTER — ROUTINE PRENATAL (OUTPATIENT)
Dept: OBSTETRICS AND GYNECOLOGY | Age: 22
End: 2017-07-31

## 2017-07-31 VITALS — WEIGHT: 134 LBS | SYSTOLIC BLOOD PRESSURE: 110 MMHG | BODY MASS INDEX: 23 KG/M2 | DIASTOLIC BLOOD PRESSURE: 70 MMHG

## 2017-07-31 DIAGNOSIS — Z3A.33 33 WEEKS GESTATION OF PREGNANCY: Primary | ICD-10-CM

## 2017-07-31 PROCEDURE — 99213 OFFICE O/P EST LOW 20 MIN: CPT | Performed by: OBSTETRICS & GYNECOLOGY

## 2017-07-31 NOTE — PROGRESS NOTES
Pregnancy at 33 4/7wk gest  No c/o, good movement  Abd-soft NT gravid  extr NT  A/P pregnancy at 33 4/7wk gest  Recheck growth US q4wks for h/o IUGR, plan US next apt  Scheduled for elective repeat C/S at 39 wks

## 2017-08-16 ENCOUNTER — PROCEDURE VISIT (OUTPATIENT)
Dept: OBSTETRICS AND GYNECOLOGY | Age: 22
End: 2017-08-16

## 2017-08-16 ENCOUNTER — ROUTINE PRENATAL (OUTPATIENT)
Dept: OBSTETRICS AND GYNECOLOGY | Age: 22
End: 2017-08-16

## 2017-08-16 VITALS — WEIGHT: 134 LBS | SYSTOLIC BLOOD PRESSURE: 102 MMHG | DIASTOLIC BLOOD PRESSURE: 64 MMHG | BODY MASS INDEX: 23 KG/M2

## 2017-08-16 DIAGNOSIS — Z3A.35 35 WEEKS GESTATION OF PREGNANCY: Primary | ICD-10-CM

## 2017-08-16 DIAGNOSIS — Z36.89 ULTRASOUND SCAN TO CHECK INTERVAL GROWTH OF FETUS: ICD-10-CM

## 2017-08-16 PROCEDURE — 99213 OFFICE O/P EST LOW 20 MIN: CPT | Performed by: OBSTETRICS & GYNECOLOGY

## 2017-08-16 PROCEDURE — 76816 OB US FOLLOW-UP PER FETUS: CPT | Performed by: OBSTETRICS & GYNECOLOGY

## 2017-08-16 NOTE — PROGRESS NOTES
Pregnancy at 35 6/7wk gest  No c/o  Abd-soft gravid NT  extr NT  US wt 57% AC=40% HARIS=11, vtx  A/P pregnancy at 35 6/7wk gest  1. H/o IUGR, adequate growth this pregnancy  2. Previous C/S, plan elective repeat at 39 wks  3. Labor warnings

## 2017-08-22 LAB — EXTERNAL GROUP B STREP ANTIGEN: POSITIVE

## 2017-08-23 ENCOUNTER — ROUTINE PRENATAL (OUTPATIENT)
Dept: OBSTETRICS AND GYNECOLOGY | Age: 22
End: 2017-08-23

## 2017-08-23 VITALS — BODY MASS INDEX: 23.52 KG/M2 | WEIGHT: 137 LBS | DIASTOLIC BLOOD PRESSURE: 60 MMHG | SYSTOLIC BLOOD PRESSURE: 110 MMHG

## 2017-08-23 DIAGNOSIS — Z3A.36 36 WEEKS GESTATION OF PREGNANCY: Primary | ICD-10-CM

## 2017-08-23 DIAGNOSIS — Z36.85 ANTENATAL SCREENING FOR STREPTOCOCCUS B: ICD-10-CM

## 2017-08-23 PROCEDURE — 99213 OFFICE O/P EST LOW 20 MIN: CPT | Performed by: OBSTETRICS & GYNECOLOGY

## 2017-08-23 NOTE — PROGRESS NOTES
Pregnancy at 36 6/7wk gest  C/o some cramping/contractions, good movement  Abd-soft gravid NT  Cervix- posterior/high, unable to reach os  extr NT  A/P pregnancy at   36 6/7wk gest  H/o IUGR, recheck growth next apt  GBS swab sent

## 2017-08-25 LAB — GP B STREP DNA SPEC QL NAA+PROBE: POSITIVE

## 2017-08-27 ENCOUNTER — HOSPITAL ENCOUNTER (OUTPATIENT)
Facility: HOSPITAL | Age: 22
Setting detail: OBSERVATION
Discharge: HOME | End: 2017-08-27
Attending: OBSTETRICS & GYNECOLOGY | Admitting: OBSTETRICS & GYNECOLOGY

## 2017-08-27 VITALS
DIASTOLIC BLOOD PRESSURE: 82 MMHG | RESPIRATION RATE: 16 BRPM | WEIGHT: 137 LBS | TEMPERATURE: 97.8 F | BODY MASS INDEX: 23.39 KG/M2 | SYSTOLIC BLOOD PRESSURE: 121 MMHG | HEIGHT: 64 IN | HEART RATE: 92 BPM

## 2017-08-27 PROBLEM — Z3A.37 37 WEEKS GESTATION OF PREGNANCY: Status: ACTIVE | Noted: 2017-06-17

## 2017-08-27 PROCEDURE — 59025 FETAL NON-STRESS TEST: CPT

## 2017-08-27 PROCEDURE — G0378 HOSPITAL OBSERVATION PER HR: HCPCS

## 2017-08-27 PROCEDURE — 59025 FETAL NON-STRESS TEST: CPT | Performed by: OBSTETRICS & GYNECOLOGY

## 2017-08-27 NOTE — NON STRESS TEST
Mary Chandler, a  at 37w3d with an FIOR of 2017, by Other Basis, was seen at [facility] LABOR DELIVERY for a nonstress test.    Chief Complaint   Patient presents with   • Abdominal Cramping     started during the night after having intercourse.  Last time felt one was at 0800.  Denies leaking and or bleeding.       Interpretation A  Nonstress Test Interpretation A: Reactive (17 1236 : Lynette Nichols RN)

## 2017-08-28 ENCOUNTER — TELEPHONE (OUTPATIENT)
Dept: LABOR AND DELIVERY | Facility: HOSPITAL | Age: 22
End: 2017-08-28

## 2017-08-30 ENCOUNTER — PROCEDURE VISIT (OUTPATIENT)
Dept: OBSTETRICS AND GYNECOLOGY | Age: 22
End: 2017-08-30

## 2017-08-30 ENCOUNTER — ROUTINE PRENATAL (OUTPATIENT)
Dept: OBSTETRICS AND GYNECOLOGY | Age: 22
End: 2017-08-30

## 2017-08-30 VITALS — DIASTOLIC BLOOD PRESSURE: 68 MMHG | SYSTOLIC BLOOD PRESSURE: 102 MMHG | WEIGHT: 137 LBS | BODY MASS INDEX: 23.52 KG/M2

## 2017-08-30 DIAGNOSIS — Z3A.37 37 WEEKS GESTATION OF PREGNANCY: Primary | ICD-10-CM

## 2017-08-30 DIAGNOSIS — Z36.89 ULTRASOUND SCAN TO CHECK INTERVAL GROWTH OF FETUS: ICD-10-CM

## 2017-08-30 PROCEDURE — 76816 OB US FOLLOW-UP PER FETUS: CPT | Performed by: OBSTETRICS & GYNECOLOGY

## 2017-08-30 PROCEDURE — 76819 FETAL BIOPHYS PROFIL W/O NST: CPT | Performed by: OBSTETRICS & GYNECOLOGY

## 2017-08-30 PROCEDURE — 99213 OFFICE O/P EST LOW 20 MIN: CPT | Performed by: OBSTETRICS & GYNECOLOGY

## 2017-08-30 NOTE — PROGRESS NOTES
Pregnancy at 37 6/7wk gest  Had N/V.diarrhea last pm, resolved now, able to keep down fluids ok  Denies contractions  US WT=19.5% AC=16% HARIS=6.5, recheck HARIS=8 BPP 8/8  GBS+  A/P pregnancy at 37 6/7wk gest  1. Borderline low fluid, ? R/t recent N/V/D, recommend PO hydration and recheck in 2 days  2. GBS+  3. US adequate growth

## 2017-09-01 ENCOUNTER — ROUTINE PRENATAL (OUTPATIENT)
Dept: OBSTETRICS AND GYNECOLOGY | Age: 22
End: 2017-09-01

## 2017-09-01 ENCOUNTER — PROCEDURE VISIT (OUTPATIENT)
Dept: OBSTETRICS AND GYNECOLOGY | Age: 22
End: 2017-09-01

## 2017-09-01 VITALS — DIASTOLIC BLOOD PRESSURE: 70 MMHG | SYSTOLIC BLOOD PRESSURE: 112 MMHG | BODY MASS INDEX: 23.69 KG/M2 | WEIGHT: 138 LBS

## 2017-09-01 DIAGNOSIS — O28.8 AFI (AMNIOTIC FLUID INDEX) BORDERLINE LOW: ICD-10-CM

## 2017-09-01 DIAGNOSIS — Z3A.38 38 WEEKS GESTATION OF PREGNANCY: Primary | ICD-10-CM

## 2017-09-01 PROCEDURE — 76819 FETAL BIOPHYS PROFIL W/O NST: CPT | Performed by: OBSTETRICS & GYNECOLOGY

## 2017-09-01 PROCEDURE — 99213 OFFICE O/P EST LOW 20 MIN: CPT | Performed by: OBSTETRICS & GYNECOLOGY

## 2017-09-01 NOTE — PROGRESS NOTES
Pregnancy at 38 1/7wk gest  Good movement, no contractions, declines cervical check  Eating and drinking ok  Abd-soft gravid NT  Extr NT  US HARIS=9.7 BPP 8/8, vtx  A/P pregnancy at 38 1/7wk gest  Repeat C/S planned next week

## 2017-09-05 ENCOUNTER — PROCEDURE VISIT (OUTPATIENT)
Dept: OBSTETRICS AND GYNECOLOGY | Age: 22
End: 2017-09-05

## 2017-09-05 ENCOUNTER — ROUTINE PRENATAL (OUTPATIENT)
Dept: OBSTETRICS AND GYNECOLOGY | Age: 22
End: 2017-09-05

## 2017-09-05 VITALS — DIASTOLIC BLOOD PRESSURE: 70 MMHG | BODY MASS INDEX: 24.03 KG/M2 | SYSTOLIC BLOOD PRESSURE: 112 MMHG | WEIGHT: 140 LBS

## 2017-09-05 DIAGNOSIS — Z3A.38 38 WEEKS GESTATION OF PREGNANCY: Primary | ICD-10-CM

## 2017-09-05 PROCEDURE — 99213 OFFICE O/P EST LOW 20 MIN: CPT | Performed by: OBSTETRICS & GYNECOLOGY

## 2017-09-05 PROCEDURE — 76819 FETAL BIOPHYS PROFIL W/O NST: CPT | Performed by: OBSTETRICS & GYNECOLOGY

## 2017-09-05 PROCEDURE — 76816 OB US FOLLOW-UP PER FETUS: CPT | Performed by: OBSTETRICS & GYNECOLOGY

## 2017-09-05 NOTE — PROGRESS NOTES
Pregnancy at 38 5/7wk gest  No c/o, good movement  Abd-soft gravid NT  extr NT  Pelvic- deferred per pt request  US BPP 8/8 HARIS=9 wt 13.8% AC=6.7% grade 2 placenta  A/P pregnancy at 38 5/7wk gest  H/o IUGR w/ first pregnancy, adequate growth this time  Normal HARIS, repeat C/S at 39 wks

## 2017-09-06 ENCOUNTER — HOSPITAL ENCOUNTER (INPATIENT)
Facility: HOSPITAL | Age: 22
LOS: 4 days | Discharge: HOME | End: 2017-09-10
Attending: OBSTETRICS & GYNECOLOGY | Admitting: OBSTETRICS & GYNECOLOGY

## 2017-09-06 ENCOUNTER — ANESTHESIA EVENT (OUTPATIENT)
Dept: LABOR AND DELIVERY | Facility: HOSPITAL | Age: 22
End: 2017-09-06

## 2017-09-06 ENCOUNTER — TELEPHONE (OUTPATIENT)
Dept: OBSTETRICS AND GYNECOLOGY | Age: 22
End: 2017-09-06

## 2017-09-06 ENCOUNTER — ANESTHESIA (OUTPATIENT)
Dept: LABOR AND DELIVERY | Facility: HOSPITAL | Age: 22
End: 2017-09-06

## 2017-09-06 DIAGNOSIS — J01.80 OTHER ACUTE SINUSITIS: ICD-10-CM

## 2017-09-06 DIAGNOSIS — O34.219 PREVIOUS CESAREAN DELIVERY AFFECTING PREGNANCY: Primary | ICD-10-CM

## 2017-09-06 PROBLEM — Z34.90 PREGNANCY: Status: ACTIVE | Noted: 2017-09-06

## 2017-09-06 PROBLEM — Z82.79 FAMILY HISTORY OF CONGENITAL HEART DEFECT: Status: ACTIVE | Noted: 2017-09-06

## 2017-09-06 PROBLEM — Z14.1 CYSTIC FIBROSIS CARRIER: Status: ACTIVE | Noted: 2017-09-06

## 2017-09-06 PROBLEM — Z3A.37 37 WEEKS GESTATION OF PREGNANCY: Status: RESOLVED | Noted: 2017-06-17 | Resolved: 2017-09-06

## 2017-09-06 LAB
ABO GROUP BLD: NORMAL
ALBUMIN SERPL-MCNC: 3.6 G/DL (ref 3.5–5.2)
ALBUMIN/GLOB SERPL: 1.1 G/DL
ALP SERPL-CCNC: 194 U/L (ref 39–117)
ALT SERPL W P-5'-P-CCNC: 5 U/L (ref 1–33)
ANION GAP SERPL CALCULATED.3IONS-SCNC: 15.9 MMOL/L
AST SERPL-CCNC: 13 U/L (ref 1–32)
BILIRUB SERPL-MCNC: 0.6 MG/DL (ref 0.1–1.2)
BLD GP AB SCN SERPL QL: NEGATIVE
BUN BLD-MCNC: 5 MG/DL (ref 6–20)
BUN/CREAT SERPL: 11.1 (ref 7–25)
CALCIUM SPEC-SCNC: 9.3 MG/DL (ref 8.6–10.5)
CHLORIDE SERPL-SCNC: 100 MMOL/L (ref 98–107)
CO2 SERPL-SCNC: 21.1 MMOL/L (ref 22–29)
CREAT BLD-MCNC: 0.45 MG/DL (ref 0.57–1)
DEPRECATED RDW RBC AUTO: 41.1 FL (ref 37–54)
ERYTHROCYTE [DISTWIDTH] IN BLOOD BY AUTOMATED COUNT: 13.2 % (ref 11.7–13)
GFR SERPL CREATININE-BSD FRML MDRD: >150 ML/MIN/1.73
GLOBULIN UR ELPH-MCNC: 3.4 GM/DL
GLUCOSE BLD-MCNC: 89 MG/DL (ref 65–99)
HCT VFR BLD AUTO: 35.9 % (ref 35.6–45.5)
HGB BLD-MCNC: 11.7 G/DL (ref 11.9–15.5)
LYMPHOCYTES # BLD MANUAL: 0.74 10*3/MM3 (ref 0.9–4.8)
LYMPHOCYTES NFR BLD MANUAL: 5 % (ref 19.6–45.3)
LYMPHOCYTES NFR BLD MANUAL: 5 % (ref 5–12)
MCH RBC QN AUTO: 28 PG (ref 26.9–32)
MCHC RBC AUTO-ENTMCNC: 32.6 G/DL (ref 32.4–36.3)
MCV RBC AUTO: 85.9 FL (ref 80.5–98.2)
MONOCYTES # BLD AUTO: 0.74 10*3/MM3 (ref 0.2–1.2)
NEUTROPHILS # BLD AUTO: 13.27 10*3/MM3 (ref 1.9–8.1)
NEUTROPHILS NFR BLD MANUAL: 90 % (ref 42.7–76)
PLAT MORPH BLD: NORMAL
PLATELET # BLD AUTO: 209 10*3/MM3 (ref 140–500)
PMV BLD AUTO: 11.7 FL (ref 6–12)
POTASSIUM BLD-SCNC: 3.9 MMOL/L (ref 3.5–5.2)
PROT SERPL-MCNC: 7 G/DL (ref 6–8.5)
RBC # BLD AUTO: 4.18 10*6/MM3 (ref 3.9–5.2)
RBC MORPH BLD: NORMAL
RH BLD: POSITIVE
SODIUM BLD-SCNC: 137 MMOL/L (ref 136–145)
WBC MORPH BLD: NORMAL
WBC NRBC COR # BLD: 14.74 10*3/MM3 (ref 4.5–10.7)

## 2017-09-06 PROCEDURE — 86850 RBC ANTIBODY SCREEN: CPT | Performed by: OBSTETRICS & GYNECOLOGY

## 2017-09-06 PROCEDURE — 86900 BLOOD TYPING SEROLOGIC ABO: CPT | Performed by: OBSTETRICS & GYNECOLOGY

## 2017-09-06 PROCEDURE — 80053 COMPREHEN METABOLIC PANEL: CPT | Performed by: OBSTETRICS & GYNECOLOGY

## 2017-09-06 PROCEDURE — 25010000002 ONDANSETRON PER 1 MG: Performed by: ANESTHESIOLOGY

## 2017-09-06 PROCEDURE — 59514 CESAREAN DELIVERY ONLY: CPT | Performed by: OBSTETRICS & GYNECOLOGY

## 2017-09-06 PROCEDURE — 85027 COMPLETE CBC AUTOMATED: CPT | Performed by: OBSTETRICS & GYNECOLOGY

## 2017-09-06 PROCEDURE — S0260 H&P FOR SURGERY: HCPCS | Performed by: OBSTETRICS & GYNECOLOGY

## 2017-09-06 PROCEDURE — 25010000002 PHENYLEPHRINE PER 1 ML: Performed by: ANESTHESIOLOGY

## 2017-09-06 PROCEDURE — 59025 FETAL NON-STRESS TEST: CPT

## 2017-09-06 PROCEDURE — 86901 BLOOD TYPING SEROLOGIC RH(D): CPT | Performed by: OBSTETRICS & GYNECOLOGY

## 2017-09-06 PROCEDURE — 85007 BL SMEAR W/DIFF WBC COUNT: CPT | Performed by: OBSTETRICS & GYNECOLOGY

## 2017-09-06 PROCEDURE — 63710000001 DIPHENHYDRAMINE PER 50 MG: Performed by: ANESTHESIOLOGY

## 2017-09-06 PROCEDURE — 25010000003 CEFAZOLIN IN DEXTROSE 2-4 GM/100ML-% SOLUTION: Performed by: OBSTETRICS & GYNECOLOGY

## 2017-09-06 RX ORDER — MORPHINE SULFATE 1 MG/ML
INJECTION, SOLUTION EPIDURAL; INTRATHECAL; INTRAVENOUS
Status: DISPENSED
Start: 2017-09-06 | End: 2017-09-07

## 2017-09-06 RX ORDER — DIPHENHYDRAMINE HCL 25 MG
25 CAPSULE ORAL EVERY 4 HOURS PRN
Status: DISCONTINUED | OUTPATIENT
Start: 2017-09-06 | End: 2017-09-10 | Stop reason: HOSPADM

## 2017-09-06 RX ORDER — PHYTONADIONE 2 MG/ML
INJECTION, EMULSION INTRAMUSCULAR; INTRAVENOUS; SUBCUTANEOUS
Status: DISPENSED
Start: 2017-09-06 | End: 2017-09-07

## 2017-09-06 RX ORDER — ONDANSETRON 4 MG/1
4 TABLET, FILM COATED ORAL EVERY 8 HOURS PRN
Status: DISCONTINUED | OUTPATIENT
Start: 2017-09-06 | End: 2017-09-10 | Stop reason: HOSPADM

## 2017-09-06 RX ORDER — IBUPROFEN 600 MG/1
600 TABLET ORAL EVERY 8 HOURS PRN
Status: DISCONTINUED | OUTPATIENT
Start: 2017-09-06 | End: 2017-09-10 | Stop reason: HOSPADM

## 2017-09-06 RX ORDER — CEFAZOLIN SODIUM 2 G/100ML
2 INJECTION, SOLUTION INTRAVENOUS ONCE
Status: COMPLETED | OUTPATIENT
Start: 2017-09-06 | End: 2017-09-06

## 2017-09-06 RX ORDER — METHYLERGONOVINE MALEATE 0.2 MG/ML
200 INJECTION INTRAVENOUS ONCE AS NEEDED
Status: DISCONTINUED | OUTPATIENT
Start: 2017-09-06 | End: 2017-09-06 | Stop reason: HOSPADM

## 2017-09-06 RX ORDER — OXYTOCIN/RINGER'S LACTATE 10/500ML
999 PLASTIC BAG, INJECTION (ML) INTRAVENOUS ONCE
Status: COMPLETED | OUTPATIENT
Start: 2017-09-06 | End: 2017-09-06

## 2017-09-06 RX ORDER — DIPHENHYDRAMINE HYDROCHLORIDE 50 MG/ML
25 INJECTION INTRAMUSCULAR; INTRAVENOUS EVERY 4 HOURS PRN
Status: DISCONTINUED | OUTPATIENT
Start: 2017-09-06 | End: 2017-09-10 | Stop reason: HOSPADM

## 2017-09-06 RX ORDER — DOCUSATE SODIUM 100 MG/1
100 CAPSULE, LIQUID FILLED ORAL 2 TIMES DAILY PRN
Status: DISCONTINUED | OUTPATIENT
Start: 2017-09-06 | End: 2017-09-10 | Stop reason: HOSPADM

## 2017-09-06 RX ORDER — SODIUM CHLORIDE, SODIUM LACTATE, POTASSIUM CHLORIDE, CALCIUM CHLORIDE 600; 310; 30; 20 MG/100ML; MG/100ML; MG/100ML; MG/100ML
125 INJECTION, SOLUTION INTRAVENOUS CONTINUOUS
Status: DISCONTINUED | OUTPATIENT
Start: 2017-09-06 | End: 2017-09-10 | Stop reason: HOSPADM

## 2017-09-06 RX ORDER — SIMETHICONE 80 MG
80 TABLET,CHEWABLE ORAL 4 TIMES DAILY PRN
Status: DISCONTINUED | OUTPATIENT
Start: 2017-09-06 | End: 2017-09-10 | Stop reason: HOSPADM

## 2017-09-06 RX ORDER — ONDANSETRON 2 MG/ML
4 INJECTION INTRAMUSCULAR; INTRAVENOUS ONCE AS NEEDED
Status: COMPLETED | OUTPATIENT
Start: 2017-09-06 | End: 2017-09-06

## 2017-09-06 RX ORDER — HYDROMORPHONE HYDROCHLORIDE 1 MG/ML
0.25 INJECTION, SOLUTION INTRAMUSCULAR; INTRAVENOUS; SUBCUTANEOUS
Status: DISCONTINUED | OUTPATIENT
Start: 2017-09-06 | End: 2017-09-06 | Stop reason: HOSPADM

## 2017-09-06 RX ORDER — LANOLIN 100 %
OINTMENT (GRAM) TOPICAL
Status: DISCONTINUED | OUTPATIENT
Start: 2017-09-06 | End: 2017-09-10 | Stop reason: HOSPADM

## 2017-09-06 RX ORDER — OXYTOCIN/RINGER'S LACTATE 10/500ML
999 PLASTIC BAG, INJECTION (ML) INTRAVENOUS ONCE
Status: DISCONTINUED | OUTPATIENT
Start: 2017-09-06 | End: 2017-09-10 | Stop reason: HOSPADM

## 2017-09-06 RX ORDER — SODIUM CHLORIDE, SODIUM LACTATE, POTASSIUM CHLORIDE, CALCIUM CHLORIDE 600; 310; 30; 20 MG/100ML; MG/100ML; MG/100ML; MG/100ML
999 INJECTION, SOLUTION INTRAVENOUS ONCE
Status: COMPLETED | OUTPATIENT
Start: 2017-09-06 | End: 2017-09-06

## 2017-09-06 RX ORDER — SODIUM CHLORIDE, SODIUM LACTATE, POTASSIUM CHLORIDE, CALCIUM CHLORIDE 600; 310; 30; 20 MG/100ML; MG/100ML; MG/100ML; MG/100ML
125 INJECTION, SOLUTION INTRAVENOUS CONTINUOUS
Status: DISCONTINUED | OUTPATIENT
Start: 2017-09-06 | End: 2017-09-06

## 2017-09-06 RX ORDER — OXYCODONE HYDROCHLORIDE AND ACETAMINOPHEN 5; 325 MG/1; MG/1
1 TABLET ORAL EVERY 4 HOURS PRN
Status: DISCONTINUED | OUTPATIENT
Start: 2017-09-06 | End: 2017-09-10 | Stop reason: HOSPADM

## 2017-09-06 RX ORDER — ONDANSETRON 2 MG/ML
4 INJECTION INTRAMUSCULAR; INTRAVENOUS ONCE AS NEEDED
Status: DISCONTINUED | OUTPATIENT
Start: 2017-09-06 | End: 2017-09-10 | Stop reason: HOSPADM

## 2017-09-06 RX ORDER — HYDROXYZINE 50 MG/1
50 TABLET, FILM COATED ORAL EVERY 6 HOURS PRN
Status: DISCONTINUED | OUTPATIENT
Start: 2017-09-06 | End: 2017-09-10 | Stop reason: HOSPADM

## 2017-09-06 RX ORDER — ERYTHROMYCIN 5 MG/G
OINTMENT OPHTHALMIC
Status: DISPENSED
Start: 2017-09-06 | End: 2017-09-07

## 2017-09-06 RX ORDER — OXYCODONE HYDROCHLORIDE AND ACETAMINOPHEN 5; 325 MG/1; MG/1
2 TABLET ORAL EVERY 4 HOURS PRN
Status: DISCONTINUED | OUTPATIENT
Start: 2017-09-06 | End: 2017-09-10 | Stop reason: HOSPADM

## 2017-09-06 RX ORDER — OXYTOCIN/RINGER'S LACTATE 10/500ML
125 PLASTIC BAG, INJECTION (ML) INTRAVENOUS CONTINUOUS PRN
Status: ACTIVE | OUTPATIENT
Start: 2017-09-06 | End: 2017-09-07

## 2017-09-06 RX ORDER — OXYTOCIN/RINGER'S LACTATE 10/500ML
125 PLASTIC BAG, INJECTION (ML) INTRAVENOUS CONTINUOUS PRN
Status: DISCONTINUED | OUTPATIENT
Start: 2017-09-06 | End: 2017-09-06 | Stop reason: HOSPADM

## 2017-09-06 RX ORDER — MISOPROSTOL 200 UG/1
800 TABLET ORAL AS NEEDED
Status: DISCONTINUED | OUTPATIENT
Start: 2017-09-06 | End: 2017-09-06 | Stop reason: HOSPADM

## 2017-09-06 RX ORDER — CARBOPROST TROMETHAMINE 250 UG/ML
250 INJECTION, SOLUTION INTRAMUSCULAR AS NEEDED
Status: DISCONTINUED | OUTPATIENT
Start: 2017-09-06 | End: 2017-09-06 | Stop reason: HOSPADM

## 2017-09-06 RX ORDER — ONDANSETRON 2 MG/ML
4 INJECTION INTRAMUSCULAR; INTRAVENOUS EVERY 6 HOURS PRN
Status: DISCONTINUED | OUTPATIENT
Start: 2017-09-06 | End: 2017-09-10 | Stop reason: HOSPADM

## 2017-09-06 RX ADMIN — SODIUM CHLORIDE, POTASSIUM CHLORIDE, SODIUM LACTATE AND CALCIUM CHLORIDE 999 ML/HR: 600; 310; 30; 20 INJECTION, SOLUTION INTRAVENOUS at 14:00

## 2017-09-06 RX ADMIN — CEFAZOLIN SODIUM 2 G: 2 INJECTION, SOLUTION INTRAVENOUS at 17:21

## 2017-09-06 RX ADMIN — PHENYLEPHRINE HYDROCHLORIDE 100 MCG: 10 INJECTION INTRAVENOUS at 17:57

## 2017-09-06 RX ADMIN — SODIUM CHLORIDE, POTASSIUM CHLORIDE, SODIUM LACTATE AND CALCIUM CHLORIDE 125 ML/HR: 600; 310; 30; 20 INJECTION, SOLUTION INTRAVENOUS at 22:00

## 2017-09-06 RX ADMIN — OXYTOCIN 500 ML: 10 INJECTION, SOLUTION INTRAMUSCULAR; INTRAVENOUS at 18:30

## 2017-09-06 RX ADMIN — OXYCODONE HYDROCHLORIDE AND ACETAMINOPHEN 2 TABLET: 5; 325 TABLET ORAL at 19:46

## 2017-09-06 RX ADMIN — SODIUM CHLORIDE, POTASSIUM CHLORIDE, SODIUM LACTATE AND CALCIUM CHLORIDE 125 ML/HR: 600; 310; 30; 20 INJECTION, SOLUTION INTRAVENOUS at 15:30

## 2017-09-06 RX ADMIN — ONDANSETRON 4 MG: 2 INJECTION INTRAMUSCULAR; INTRAVENOUS at 17:19

## 2017-09-06 RX ADMIN — OXYTOCIN 500 ML: 10 INJECTION, SOLUTION INTRAMUSCULAR; INTRAVENOUS at 18:06

## 2017-09-06 RX ADMIN — DIPHENHYDRAMINE HYDROCHLORIDE 25 MG: 25 CAPSULE ORAL at 23:54

## 2017-09-06 NOTE — ANESTHESIA POSTPROCEDURE EVALUATION
"Patient: Mary Chandler    Procedure Summary     Date Anesthesia Start Anesthesia Stop Room / Location    17 2248 1944  [facility] /  [facility]       Procedure Diagnosis Surgeon Provider     SECTION REPEAT (N/A Abdomen) Previous  delivery affecting pregnancy  (Previous  delivery affecting pregnancy [O34.219]) MD Emiliano Sanchez MD          Anesthesia Type: spinal  Last vitals  BP   119/77 (17)    Temp        Pulse   80 (17)   Resp   16 (17)    SpO2   99 % (17)      Post Anesthesia Care and Evaluation    Patient location during evaluation: PACU  Patient participation: complete - patient participated  Level of consciousness: awake and alert  Pain management: adequate  Airway patency: patent  Anesthetic complications: No anesthetic complications    Cardiovascular status: acceptable  Respiratory status: acceptable  Hydration status: acceptable    Comments: /77  Pulse 80  Temp 36.3 °C (97.4 °F) (Oral)   Resp 16  Ht 64\" (162.6 cm)  Wt 140 lb (63.5 kg)  LMP 2016 (Exact Date)  SpO2 99%  Breastfeeding? Yes  BMI 24.03 kg/m2      "

## 2017-09-06 NOTE — PLAN OF CARE
Problem: Patient Care Overview (Adult)  Goal: Adult Individualization and Mutuality  Outcome: Ongoing (interventions implemented as appropriate)    17 1557 17   Individualization   Patient Specific Preferences Breastfeeding; fob to cut the cord --    Patient Specific Goals healthy mom and baby --    Patient Specific Interventions Social service consult RE: finances/housing --    Mutuality/Individual Preferences   What Anxieties, Fears or Concerns Do You Have About Your Health or Care? --  none   What Questions Do You Have About Your Health or Care? --  none   What Information Would Help Us Give You More Personalized Care? --  would like update on baby       Goal: Discharge Needs Assessment  Outcome: Ongoing (interventions implemented as appropriate)    17   Discharge Needs Assessment   Concerns To Be Addressed no discharge needs identified         Problem:  Delivery (Adult,Obstetrics,Pediatric)  Goal: Signs and Symptoms of Listed Potential Problems Will be Absent or Manageable ( Delivery)  Outcome: Outcome(s) achieved Date Met:  17    Delivery   Problems Assessed ( Delivery) all   Problems Present ( Delivery) none         Problem: Postpartum ( Delivery) (Adult)  Goal: Signs and Symptoms of Listed Potential Problems Will be Absent or Manageable (Postpartum)  Outcome: Ongoing (interventions implemented as appropriate)    17   Postpartum ( Delivery)   Problems Assessed (Postpartum ) all   Problems Present (Postpartum ) none         Problem: Anesthesia/Analgesia, Neuraxial (Obstetrics)  Goal: Signs and Symptoms of Listed Potential Problems Will be Absent or Manageable (Anesthesia/Analgesia, Neuraxial)  Outcome: Ongoing (interventions implemented as appropriate)    17   Anesthesia/Analgesia, Neuraxial   Problems Assessed (Neuraxial Anesthesia/Analgesia, OB) all   Problems  Present (Neuraxial Anesthesia/Analgesia, OB) none

## 2017-09-06 NOTE — NON STRESS TEST
Mary Chandler, a  at 38w6d with an FIOR of 2017, by Other Basis, was seen at [facility] ANTEPARTUM UNIT for a nonstress test.    No chief complaint on file.      Interpretation A   Nonstress Test Interpretation A   Reactive

## 2017-09-06 NOTE — TELEPHONE ENCOUNTER
Dr VALENZUELA pt, will be 39 wks tomorrow also sched for c-sect, up all night long, throwing up bile 5x, Right side of face is swollen, congested green-yellow, h/a, no fever, only sleeping 10 min at a time. Pt stated she had us done yest fluids were 9. Please advise

## 2017-09-06 NOTE — H&P
[facility]  Obstetric History and Physical    Chief Complaint   Patient presents with   • Vomiting     was vomiting this morning, uncomfortable and unable to sleep       Subjective     Patient is a 22 y.o. female  currently at 38w6d, presented with N/V and not feeling well. C/o abdominal tightening all day today. No bleeding or leakage of fluid. Good fetal movement.    Her prenatal care is complicated by  prior   desires repeat .  Her previous obstetric/gynecological history is noted for is remarkable for  IUGR and oligo. S/p Chlamydia this pregnancy.     The following portions of the patients history were reviewed and updated as appropriate: current medications, allergies, past medical history, past surgical history, past family history, past social history and problem list .       Prenatal Information:  Prenatal Results         1st Trimester Ref. Range Date Time   CBC with auto diff       Rubella IgG  2.83 index Immune >0.99 index 17 0918   Hepatitis B SAg  Negative  Negative 17 0918   RPR  Non Reactive  Non Reactive 17 0918   ABO  A   17 1456   Rh  Positive   17 1456   Anibody Screen  Negative   17 1456   HIV  Non Reactive  Non Reactive 17 0918   Varicella IgG ^ +History   17    Urinalysis with microscopy       Urine Culture ^ no growth   17    GC/Chlamydia/TV ^ +Chl/GC negative   17    ThinPrep/Pap       2nd and 3rd Trimester Ref. Range Date Time   Hemoglobin / Hematocrit  35.9 % 35.6 - 45.5 % 17 1456   Hemoglobin  11.7 g/dL (L) 11.9 - 15.5 g/dL 17 1456   Group B Strep Screen  Positive  (A) Negative 17 1443   Group B Strep Culture       GCT  99 mg/dL 65 - 139 mg/dL 17 1355   Glucose Fasting       Glucose 1 Hr       Glucose 2 Hr       Glucose 3 Hr       Pre-eclampsia Panel       Risk Screening Ref. Range Date Time   Fetal Fibronectin       Amnisure       Hepatitis C Antibody       Hemoglobin  electrophoresis       Cystic Fibrosis ^ +carrier   17    Hemoglobin A1C       MSAFP - 4       NIPT ^ cell free dna normal, male   17    AFP       Parvovirus IgG       Parvovirus IgM       POCT - glucose       [facility]       24 Hour urine - Total protein       24 Hour urine - Creatinine clearance       Urinalysis with microscopy       Urine Culture ^ no growth   17    Drug Screening Ref. Range Date Time   Amphetamine Screen       Barbiturate Screen       Benzodiazepine Screen       Methadone Screen       Phencyclidine Screen       Opiates Screen       THC Screen       Cocaine Screen       Propoxyphene Screen       Buprenorphine Screen       Methamphetamine Screen       Oxycodone Screen       Tryicyclic Antidepressants Screen              Legend: ^: Historical            View all results for this pregnancy        External Prenatal Results         Pregnancy Outside Results - these were transcribed from office records.  See scanned records for details. Date Time   Hgb      Hct      ABO ^ A  09/22/15    Rh ^ Positive  09/22/15    Antibody Screen ^ Normal  09/22/15    Glucose Fasting GTT ^ 62 mg/dL 16    Glucose Tolerance Test 1 hour      Glucose Tolerance Test 3 hour      Gonorrhea (discrete) ^ Negative  09/22/15    Chlamydia (discrete) ^ Negative  09/22/15    RPR ^ Non-Reactive  09/22/15    VDRL      Syphillis Antibody      Rubella ^ Immune  17    HBsAg ^ Negative  09/22/15    Herpes Simplex Virus PCR      Herpes Simplex VIrus Culture      HIV ^ Negative  17    Hep C RNA Quant PCR      Hep C Antibody      Urine Drug Screen      AFP      Group B Strep ^ Positive  17    GBS Susceptibility to Clindamycin      GBS Susceptibility to Eythromycin      Fetal Fibronectin      Genetic Testing, Maternal Blood             Legend: ^: Historical           Past OB History:     Obstetric History       T1      TAB0   SAB0   E0   M0   L1       # Outcome Date GA Lbr Jeet/2nd Weight  Sex Delivery Anes PTL Lv   2 Current            1 Term 16 37w1d  5 lb 9.6 oz (2.54 kg) F CS-LTranv Spinal N Y      Complications: IUGR (intrauterine growth restriction) affecting care of mother,Oligohydramnios in pacheco pregnancy in third trimester,Breech presentation      Apgar1:  8                Apgar5: 9          Past Medical History: Past Medical History:   Diagnosis Date   • Anxiety     does not take anything   • Asthma    • Depression     current, denies any thoughts of self-harm, not medicated   • Ovarian cyst       Past Surgical History Past Surgical History:   Procedure Laterality Date   • ADENOIDECTOMY     • APPENDECTOMY     •  SECTION N/A 3/16/2016    Procedure:  SECTION PRIMARY;  Surgeon: Marine Peterson MD;  Location: [facility] LABOR DELIVERY;  Service:    • TONSILLECTOMY        Family History: Family History   Problem Relation Age of Onset   • Heart defect Brother    • Heart defect Sister    • Congenital heart disease Neg Hx       Social History:  reports that she has never smoked. She has never used smokeless tobacco.   reports that she does not drink alcohol.   reports that she does not use illicit drugs.        General ROS: Pertinent items are noted in HPI    Objective       Vital Signs Range for the last 24 hours  Temperature: Temp:  [97.9 °F (36.6 °C)-98.2 °F (36.8 °C)] 97.9 °F (36.6 °C)   Temp Source: Temp src: Oral   BP: BP: (113-125)/(79-88) 113/88   Pulse: Heart Rate:  [] 98   Respirations: Resp:  [16-18] 18   SPO2: SpO2:  [98 %] 98 %   O2 Amount (l/min):     O2 Devices O2 Device: room air   Weight: Weight:  [140 lb (63.5 kg)] 140 lb (63.5 kg)     Physical Examination: General appearance - alert, well appearing, and in no distress  Mental status - alert, oriented to person, place, and time  Abdomen - soft, nontender, gravid  Back exam - full range of motion, no tenderness, palpable spasm or pain on motion  Neurological - alert, oriented, normal speech, no focal  findings or movement disorder noted  Musculoskeletal - no joint tenderness, deformity or swelling  Extremities - peripheral pulses normal, no pedal edema, no clubbing or cyanosis  Skin - normal coloration and turgor, no rashes, no suspicious skin lesions noted    Presentation: vtx   Cervix: Exam by:     Dilation:     Effacement:     Station:         Fetal Heart Rate Assessment   Method: Fetal HR Assessment Method: external   Beats/min: Fetal HR (Beats/Min): 145   Baseline: Fetal HR Baseline: normal range (110-160 bpm)   Varibility: Fetal HR Variability: moderate (amplitude range 6 to 25 bpm)   Accels: Fetal HR Accelerations: greater than/equal to 15 bpm   Decels: Fetal HR Decelerations: absent   Tracing Category:       Uterine Assessment   Method: Method: TOCO (external toco transducer)   Frequency (min): Contraction Frequency (min): 2-6   Ctx Count in 10 min: Contraction Frequency in 10min: 3   Duration: Contraction Duration (sec): 40-60   Intensity: Contraction Intensity: mild by palpation   Intensity by IUPC:     Resting Tone: Uterine Resting Tone: soft by palpation   Resting Tone by IUPC:     [place] Units:       Laboratory Results:CBC and CMP normal  Radiology Review:   Other Studies:     Assessment/Plan     Active Problems:    Pregnancy    Previous  delivery affecting pregnancy    Pt in early labor, proceed w/ repeat C/S    Assessment:  1.  Intrauterine pregnancy at 38w6d weeks gestation with reassuring fetal status.    2.  labor  without ROM  3.  Obstetrical history significant for is remarkable for  C/S  4.  GBS status: positive    Plan:  1.   2. Plan of care has been reviewed with patient and family  3.  Risks, benefits of treatment plan have been discussed.  4.  All questions have been answered.  5.         Marine Peterson MD  2017  5:31 PM

## 2017-09-06 NOTE — ANESTHESIA PREPROCEDURE EVALUATION
Anesthesia Evaluation            Airway   Mallampati: I  TM distance: >3 FB  Neck ROM: full  Dental - normal exam     Pulmonary - normal exam   (+) asthma,   Cardiovascular     Rhythm: regular    (-) murmur      Neuro/Psych  GI/Hepatic/Renal/Endo      Musculoskeletal     Abdominal    Substance History      OB/GYN    (+) Pregnant,         Other                                        Anesthesia Plan    ASA 2     spinal   (D/W pt SAB and poss of PDPH, discomfort and N/V)

## 2017-09-06 NOTE — OP NOTE
SECTION FULL OPERATIVE REPORT  Pre-operative Diagnosis: pregnancy at 38 6/7wk gest, previous , desires elective repeat C/S, early labor  Post-operative Diagnosis: same  Procedure: repeat low transverse  Section  Anesthesia: regional and spinal  Surgeon(s): Marine Peterson M.D.  Assistant(s): Andres Kearns M.D.  Infant: LV male infant, vertex, thin meconium, Apgars 8/9, weight 7lbs, 0 oz  Findings: Normal uterus, tubes and ovaries  Complications: none  Specimens: cord blood, placenta SI/3VC  EBL: 800 mL      Description of Procedure:  The patient was taken to the operating room where anesthesia was found to be adequate. She was prepped and draped in the usual sterile fashion in the dorsal supine position with a leftward tilt.   A Pfannenstiel skin incision was made around the old keloid scar with the scalpel and excised. This incision was carried through the fat to the underlying layer of fascia. The fascia was then incised in the midline and the incision was extended laterally with beasley scissors. The superior aspect of the fascia was then grasped with Kocher clamps and the underlying rectus muscles were then dissected off bluntly with the Beasley scissors. The inferior aspect of the fascia was then grasped with Kocher clamps and dissected off similarly with Beasley scissors. The rectus muscles were  and the peritoneum entered. The peritoneal incision was then carried superiorly and inferiorly taking care to identify the bladder at the lower aspect.   The bladder blade was inserted. The vesicouterine peritoneum was then identified and entered sharply with Metzenbaum scissors and a bladder flap was created.. The bladder blade was reinserted and the uterine incision was made in a low transverse fashion using the scalpel and extended bluntly w/ the surgeons fingers. Thin meconium fluid was noted.    The bladder blade was removed and the infant was delivered atraumatically. The nose and mouth  were suctioned and the cord was clamped and cut. The infant was taken to the warmer for the waiting staff. Some particulate meconium was noted after baby delivered. Cord blood was collected. The placenta was removed SI/3VC. The uterus was left in-situ and cleared of all clots and debris. The uterine incision was repaired using 0 monocryl suture in a running-locking fashion. The uterus was examined and noted to be hemostatic.  The gutters were cleared of all clots and debris. The uterus was then re-inspected to ensure hemostasis as were all subfascial tissues. The fascia was re-approximated in a running fashion using 0-vicryl suture. The subcutaneous tissue was re-approximated in a running fashion with 3-0 monocryl. The skin was closed with 4-0 monocryl. Steri strips were applied.    The patient tolerated the procedure well. Sponge, lap and needle counts were correct. The patient was taken to recovery in stable condition. Pt was given Kefzol prior to the incision for infection prophylaxis.    Marine Peterson MD  September 6, 2017

## 2017-09-06 NOTE — ANESTHESIA PROCEDURE NOTES
Spinal Block    Performed By  Anesthesiologist: RENDER, AL RAY  Preanesthetic Checklist  Completed: patient identified, site marked, surgical consent, pre-op evaluation, timeout performed, IV checked, risks and benefits discussed and monitors and equipment checked  Spinal Block Prep:  Sterile Tech:cap, gloves and sterile barriers  Patient Monitoring:EKG, continuous pulse oximetry and blood pressure monitoring  Spinal Block Procedure  Approach:midline  Guidance:palpation technique  Location:L4-L5  Needle Type:Sprotte  Needle Gauge:24 G    Fluid Appearance:clear  Post Assessment  Patient Tolerance:patient tolerated the procedure well with no apparent complications  Complications no  Additional Notes  Bup 12 mg + duramorph 0.3 mg

## 2017-09-07 LAB
BASOPHILS # BLD AUTO: 0.01 10*3/MM3 (ref 0–0.2)
BASOPHILS NFR BLD AUTO: 0.1 % (ref 0–1.5)
DEPRECATED RDW RBC AUTO: 41.3 FL (ref 37–54)
EOSINOPHIL # BLD AUTO: 0.04 10*3/MM3 (ref 0–0.7)
EOSINOPHIL NFR BLD AUTO: 0.3 % (ref 0.3–6.2)
ERYTHROCYTE [DISTWIDTH] IN BLOOD BY AUTOMATED COUNT: 13.2 % (ref 11.7–13)
HCT VFR BLD AUTO: 33.7 % (ref 35.6–45.5)
HGB BLD-MCNC: 11 G/DL (ref 11.9–15.5)
IMM GRANULOCYTES # BLD: 0.05 10*3/MM3 (ref 0–0.03)
IMM GRANULOCYTES NFR BLD: 0.4 % (ref 0–0.5)
LYMPHOCYTES # BLD AUTO: 1.99 10*3/MM3 (ref 0.9–4.8)
LYMPHOCYTES NFR BLD AUTO: 14.3 % (ref 19.6–45.3)
MCH RBC QN AUTO: 28.1 PG (ref 26.9–32)
MCHC RBC AUTO-ENTMCNC: 32.6 G/DL (ref 32.4–36.3)
MCV RBC AUTO: 86 FL (ref 80.5–98.2)
MONOCYTES # BLD AUTO: 1.39 10*3/MM3 (ref 0.2–1.2)
MONOCYTES NFR BLD AUTO: 10 % (ref 5–12)
NEUTROPHILS # BLD AUTO: 10.41 10*3/MM3 (ref 1.9–8.1)
NEUTROPHILS NFR BLD AUTO: 74.9 % (ref 42.7–76)
PLATELET # BLD AUTO: 181 10*3/MM3 (ref 140–500)
PMV BLD AUTO: 11.9 FL (ref 6–12)
RBC # BLD AUTO: 3.92 10*6/MM3 (ref 3.9–5.2)
WBC NRBC COR # BLD: 13.89 10*3/MM3 (ref 4.5–10.7)

## 2017-09-07 PROCEDURE — 85025 COMPLETE CBC W/AUTO DIFF WBC: CPT | Performed by: OBSTETRICS & GYNECOLOGY

## 2017-09-07 PROCEDURE — 99232 SBSQ HOSP IP/OBS MODERATE 35: CPT | Performed by: OBSTETRICS & GYNECOLOGY

## 2017-09-07 PROCEDURE — 63710000001 DIPHENHYDRAMINE PER 50 MG: Performed by: ANESTHESIOLOGY

## 2017-09-07 RX ADMIN — IBUPROFEN 600 MG: 600 TABLET ORAL at 19:32

## 2017-09-07 RX ADMIN — IBUPROFEN 600 MG: 600 TABLET ORAL at 09:37

## 2017-09-07 RX ADMIN — OXYCODONE HYDROCHLORIDE AND ACETAMINOPHEN 1 TABLET: 5; 325 TABLET ORAL at 16:44

## 2017-09-07 RX ADMIN — IBUPROFEN 600 MG: 600 TABLET ORAL at 01:24

## 2017-09-07 RX ADMIN — OXYCODONE HYDROCHLORIDE AND ACETAMINOPHEN 2 TABLET: 5; 325 TABLET ORAL at 20:50

## 2017-09-07 RX ADMIN — DIPHENHYDRAMINE HYDROCHLORIDE 25 MG: 25 CAPSULE ORAL at 09:37

## 2017-09-07 RX ADMIN — DOCUSATE SODIUM 100 MG: 100 CAPSULE, LIQUID FILLED ORAL at 08:43

## 2017-09-07 RX ADMIN — OXYCODONE HYDROCHLORIDE AND ACETAMINOPHEN 2 TABLET: 5; 325 TABLET ORAL at 01:24

## 2017-09-07 RX ADMIN — OXYCODONE HYDROCHLORIDE AND ACETAMINOPHEN 1 TABLET: 5; 325 TABLET ORAL at 09:38

## 2017-09-07 RX ADMIN — SIMETHICONE CHEW TAB 80 MG 80 MG: 80 TABLET ORAL at 16:43

## 2017-09-07 RX ADMIN — DOCUSATE SODIUM 100 MG: 100 CAPSULE, LIQUID FILLED ORAL at 16:44

## 2017-09-07 RX ADMIN — DIPHENHYDRAMINE HYDROCHLORIDE 25 MG: 25 CAPSULE ORAL at 20:53

## 2017-09-07 RX ADMIN — SIMETHICONE CHEW TAB 80 MG 80 MG: 80 TABLET ORAL at 08:43

## 2017-09-07 RX ADMIN — Medication: at 03:35

## 2017-09-07 NOTE — PROGRESS NOTES
Continued Stay Note  [facility]     Patient Name: Mary Chandler  MRN: [id number]  Today's Date: 9/7/2017    Admit Date: 9/6/2017          Discharge Plan       09/07/17 1433    Case Management/Social Work Plan    Additional Comments Community resources for clothes and diapers, the  phone number for [facility] [facility] and Phone number   for [facility]   for rent and utilities  were given to mom...     MOOK Bello      09/07/17 1344    Case Management/Social Work Plan    Plan Mom does not want to talk in front of the FOB.Baby is Robby Pham---[phone]     Additional Comments Referral received that baby's   urine was being collected due to baby's  increased tone . Also momMary told the  nurses that she does not  have everything she needs for the baby. Spoke with mom Mary. .She lives with  the FOB in  his mother's  house.Address on chart  corrected   Mom plans to breast feed her son, Robby Pham and plans to use [facility] for pediatric care.  Mary  wants  to move from FOB's house and   says she is looking at  an apartment at the [facility] in [facility].She states she is current with CPS in [facility]. Her  worker is Do Stevenson.  Have placed a call to Ms [phone] and requested a return call.  Mom has Passport.  Will check with [employer] to see if baby's Passport  application has been initiated. Mom  woks at [employer] and plans to return to work once medically released .Shee is studying to take her GED. She has a car seat, pack in play and a toddler bed for her older child, Alicja. Mom is requesting   community resources  for housing and diapers.  Will offer Community resources, [facility] for food  and the [facility]  - [facility]  for rent  and utility  assist---[phone].  Await call back from Do Stevenson with CPS and  will follow for baby's  meconium to result..........   MOOK Bello               Discharge Codes     None            MOOK Bello

## 2017-09-07 NOTE — LACTATION NOTE
Infant sleepy.  Assisted with waking infant.  Reviewed cross-cradle hold to assist with deep latch.  Once latched, pt switched back to cradle hold for comfort.  Declines further assistance.

## 2017-09-07 NOTE — PROGRESS NOTES
" POSTPARTUM ROUNDS    Chief complaint: post C/S concerns  SUBJECTIVE:  Patient appears comfortable,pain and seems to be controlled with by mouth medicines.  She is ambulating. .  Discussed advancing activity and diet.  Mild nasal congesrion     OBJECTIVE:  Vital Signs: /76 (BP Location: Right arm, Patient Position: Sitting)  Pulse 90  Temp 98 °F (36.7 °C) (Oral)   Resp 16  Ht 64\" (162.6 cm)  Wt 140 lb (63.5 kg)  LMP 2016 (Exact Date)  SpO2 100%  Breastfeeding? Yes  BMI 24.03 kg/m2    Intake/Output Summary (Last 24 hours) at 17 0948  Last data filed at 17 0600   Gross per 24 hour   Intake          4591.31 ml   Output             4275 ml   Net           316.31 ml       Constitutional: The patient is well nourished.  Cardiovascular:RRR  Resp: nonlabored breathing  The incision covered   Gastrointestinal: fundus firm below umbilicus  Extremities:no edema,no evidence dvt    LABS / IMAGING:  Lab Results (last 24 hours)     Procedure Component Value Units Date/Time    CBC & Differential [[id number]] Collected:  17    Specimen:  Blood Updated:  17    Narrative:       The following orders were created for panel order CBC & Differential.  Procedure                               Abnormality         Status                     ---------                               -----------         ------                     Manual Differential[[id number]]          Abnormal            Final result               CBC Auto Differential[[id number]]        Abnormal            Final result                 Please view results for these tests on the individual orders.    CBC Auto Differential [[id number]]  (Abnormal) Collected:  17    Specimen:  Blood Updated:  17     WBC 14.74 (H) 10*3/mm3      RBC 4.18 10*6/mm3      Hemoglobin 11.7 (L) g/dL      Hematocrit 35.9 %      MCV 85.9 fL      MCH 28.0 pg      MCHC 32.6 g/dL      RDW 13.2 (H) %      RDW-SD 41.1 fl      MPV " 11.7 fL      Platelets 209 10*3/mm3     Manual Differential [[id number]]  (Abnormal) Collected:  09/06/17 1456    Specimen:  Blood Updated:  09/06/17 1540     Neutrophil % 90.0 (H) %      Lymphocyte % 5.0 (L) %      Monocyte % 5.0 %      Neutrophils Absolute 13.27 (H) 10*3/mm3      Lymphocytes Absolute 0.74 (L) 10*3/mm3      Monocytes Absolute 0.74 10*3/mm3      RBC Morphology Normal     WBC Morphology Normal     Platelet Morphology Normal    Comprehensive Metabolic Panel [[id number]]  (Abnormal) Collected:  09/06/17 1456    Specimen:  Blood Updated:  09/06/17 1540     Glucose 89 mg/dL      BUN 5 (L) mg/dL      Creatinine 0.45 (L) mg/dL      Sodium 137 mmol/L      Potassium 3.9 mmol/L      Chloride 100 mmol/L      CO2 21.1 (L) mmol/L      Calcium 9.3 mg/dL      Total Protein 7.0 g/dL      Albumin 3.60 g/dL      ALT (SGPT) 5 U/L      AST (SGOT) 13 U/L      Alkaline Phosphatase 194 (H) U/L      Total Bilirubin 0.6 mg/dL      eGFR Non African Amer >150 mL/min/1.73      Globulin 3.4 gm/dL      A/G Ratio 1.1 g/dL      BUN/Creatinine Ratio 11.1     Anion Gap 15.9 mmol/L     Group B Streptococcus Culture [[id number]] Resulted:  08/22/17     Specimen:  Swab from Vaginal/Rectum Updated:  09/06/17 1622     External Strep Group B Ag Positive    CBC & Differential [[id number]] Collected:  09/07/17 0549    Specimen:  Blood Updated:  09/07/17 0643    Narrative:       The following orders were created for panel order CBC & Differential.  Procedure                               Abnormality         Status                     ---------                               -----------         ------                     CBC Auto Differential[[id number]]        Abnormal            Final result                 Please view results for these tests on the individual orders.    CBC Auto Differential [[id number]]  (Abnormal) Collected:  09/07/17 0549    Specimen:  Blood Updated:  09/07/17 0643     WBC 13.89 (H) 10*3/mm3      RBC 3.92 10*6/mm3       Hemoglobin 11.0 (L) g/dL      Hematocrit 33.7 (L) %      MCV 86.0 fL      MCH 28.1 pg      MCHC 32.6 g/dL      RDW 13.2 (H) %      RDW-SD 41.3 fl      MPV 11.9 fL      Platelets 181 10*3/mm3      Neutrophil % 74.9 %      Lymphocyte % 14.3 (L) %      Monocyte % 10.0 %      Eosinophil % 0.3 %      Basophil % 0.1 %      Immature Grans % 0.4 %      Neutrophils, Absolute 10.41 (H) 10*3/mm3      Lymphocytes, Absolute 1.99 10*3/mm3      Monocytes, Absolute 1.39 (H) 10*3/mm3      Eosinophils, Absolute 0.04 10*3/mm3      Basophils, Absolute 0.01 10*3/mm3      Immature Grans, Absolute 0.05 (H) 10*3/mm3           ASSESSMENT AND PLAN:    Active Problems:    Pregnancy  Overview:    Previous  delivery affecting pregnancy  Overview:    Cystic fibrosis carrier  Overview:    Family history of congenital heart defect  Overview:  Resolved Problems:    * No resolved hospital problems. *      Patient is postop day 1 from LTCS  Patient is doing well, nasal congestion----benadryl  Advance diet as tolerated  Circ discussed r/b/c      Regular diet   Encourage ambulation     Sagar Pritchard MD    2017  9:48 AM

## 2017-09-07 NOTE — PLAN OF CARE
Problem: Breastfeeding (Adult,NICU,,Obstetrics,Pediatric)  Goal: Signs and Symptoms of Listed Potential Problems Will be Absent or Manageable (Breastfeeding)    17 1219   Breastfeeding   Problems Assessed (Breastfeeding) all   Problems Present (Breastfeeding) none

## 2017-09-07 NOTE — PLAN OF CARE
Problem: Patient Care Overview (Adult)  Goal: Plan of Care Review  Outcome: Ongoing (interventions implemented as appropriate)    17 1615   Coping/Psychosocial Response Interventions   Plan Of Care Reviewed With patient   Patient Care Overview   Progress improving   Outcome Evaluation   Outcome Summary/Follow up Plan VSS, bleeding WNL, ambulated to bathroom x1, breastfeeding        Goal: Adult Individualization and Mutuality  Outcome: Ongoing (interventions implemented as appropriate)  Goal: Discharge Needs Assessment  Outcome: Ongoing (interventions implemented as appropriate)    Problem: Postpartum ( Delivery) (Adult)  Goal: Signs and Symptoms of Listed Potential Problems Will be Absent or Manageable (Postpartum)  Outcome: Ongoing (interventions implemented as appropriate)    Problem: Breastfeeding (Adult,NICU,,Obstetrics,Pediatric)  Goal: Signs and Symptoms of Listed Potential Problems Will be Absent or Manageable (Breastfeeding)  Outcome: Ongoing (interventions implemented as appropriate)

## 2017-09-07 NOTE — PROGRESS NOTES
Discharge Planning Assessment  [facility]     Patient Name: Mary Chandler  MRN: [id number]  Today's Date: 9/7/2017    Admit Date: 9/6/2017          Discharge Needs Assessment       09/07/17 1351    Living Environment    Lives With significant other;other (see comments)   patient isliving with FOB and his mother    Transportation Available car;family or friend will provide            Discharge Plan       09/07/17 1345    Case Management/Social Work Plan    Plan Mom does not want to talk in front of the FOB.Baby is Robby Pham---[phone]     Additional Comments Referral received that baby's   urine was being collected due to baby's  increased tone . Also mom, Mary told the  nurses that she does not  have everything she needs for the baby. Spoke with mom Mary. .She lives with  the FOB in  his mother's  house.Address on chart  corrected   Mom plans to breast feed her son, Robby Pham and plans to use [facility] for pediatric care.  Mary  wants  to move from FOB's house and   says she is looking at  an apartment at the [facility] in [place] [facility].She states she is current with CPS in [facility]. Her  worker is Do Stevenson.  Have placed a call to Ms [phone] and requested a return call.  Mom has Passport.  Will check with [employer] t to see if baby's Passport  application has been initiated. Mom  woks at [employer] and plans to return to work once medically released .Shee is studying to take her GED. She has a car seat, pack in play and a toddler bed for her older child, Alicaj. Mom is requesting   community resources  for housing and diapers.  Will offer Community resources, [place] [facility] for food  and the [facility]  - [facility]  for rent  and utility  assist---[phone].  Await call back from Do Stevenson with CPS and  will follow for baby's  meconium to result..........   Rosaura NevilleBRANDON        Discharge Placement     No information found                 Demographic Summary       09/07/17 1349    Referral Information    Admission Type inpatient    Reason For Consult discharge planning    Contact Information    Comments Mom does not want to  tallk in frontof FOB            Functional Status       09/07/17 1350    Functional Status Current    Ambulation 1-->assistive equipment    Transferring 0-->independent    Toileting 0-->independent    Bathing 0-->independent    Dressing 0-->independent    Eating 0-->independent    Communication 0-->understands/communicates without difficulty    Functional Status Prior    Ambulation 0-->independent    Transferring 0-->independent    Toileting 0-->independent    Bathing 0-->independent    Dressing 0-->independent    Eating 0-->independent            Psychosocial     None            Abuse/Neglect     None            Legal     None            Substance Abuse     None            Patient Forms     None          MOOK Bello

## 2017-09-07 NOTE — LACTATION NOTE
"P2. 38wkr.  Pt  first x1 month and stopped after getting mastitis.  This infant nursing \"sometimes\" per pt.  Encouraged pt to call for feeding assistance with next feeding.  Rx for personal pump given.    "

## 2017-09-08 PROBLEM — J32.9 SINUSITIS: Status: ACTIVE | Noted: 2017-09-08

## 2017-09-08 PROCEDURE — 99232 SBSQ HOSP IP/OBS MODERATE 35: CPT | Performed by: OBSTETRICS & GYNECOLOGY

## 2017-09-08 RX ORDER — AZITHROMYCIN 250 MG/1
250 TABLET, FILM COATED ORAL DAILY
Status: DISCONTINUED | OUTPATIENT
Start: 2017-09-09 | End: 2017-09-10 | Stop reason: HOSPADM

## 2017-09-08 RX ORDER — AZITHROMYCIN 250 MG/1
250 TABLET, FILM COATED ORAL
Status: DISCONTINUED | OUTPATIENT
Start: 2017-09-08 | End: 2017-09-08

## 2017-09-08 RX ORDER — FLUTICASONE PROPIONATE 50 MCG
2 SPRAY, SUSPENSION (ML) NASAL DAILY
Status: DISCONTINUED | OUTPATIENT
Start: 2017-09-08 | End: 2017-09-10 | Stop reason: HOSPADM

## 2017-09-08 RX ORDER — NALOXONE HCL 0.4 MG/ML
0.2 VIAL (ML) INJECTION
Status: DISCONTINUED | OUTPATIENT
Start: 2017-09-08 | End: 2017-09-10 | Stop reason: HOSPADM

## 2017-09-08 RX ORDER — AZITHROMYCIN 250 MG/1
500 TABLET, FILM COATED ORAL DAILY
Status: COMPLETED | OUTPATIENT
Start: 2017-09-08 | End: 2017-09-08

## 2017-09-08 RX ORDER — CETIRIZINE HYDROCHLORIDE 10 MG/1
10 TABLET ORAL DAILY
Status: DISCONTINUED | OUTPATIENT
Start: 2017-09-08 | End: 2017-09-10 | Stop reason: HOSPADM

## 2017-09-08 RX ADMIN — SIMETHICONE CHEW TAB 80 MG 80 MG: 80 TABLET ORAL at 09:02

## 2017-09-08 RX ADMIN — IBUPROFEN 600 MG: 600 TABLET ORAL at 12:31

## 2017-09-08 RX ADMIN — IBUPROFEN 600 MG: 600 TABLET ORAL at 20:31

## 2017-09-08 RX ADMIN — CETIRIZINE HYDROCHLORIDE 10 MG: 10 TABLET, FILM COATED ORAL at 12:25

## 2017-09-08 RX ADMIN — DOCUSATE SODIUM 100 MG: 100 CAPSULE, LIQUID FILLED ORAL at 09:02

## 2017-09-08 RX ADMIN — OXYCODONE HYDROCHLORIDE AND ACETAMINOPHEN 2 TABLET: 5; 325 TABLET ORAL at 01:17

## 2017-09-08 RX ADMIN — OXYCODONE HYDROCHLORIDE AND ACETAMINOPHEN 2 TABLET: 5; 325 TABLET ORAL at 12:30

## 2017-09-08 RX ADMIN — AZITHROMYCIN 500 MG: 250 TABLET, FILM COATED ORAL at 12:24

## 2017-09-08 RX ADMIN — FLUTICASONE PROPIONATE 2 SPRAY: 50 SPRAY, METERED NASAL at 12:25

## 2017-09-08 RX ADMIN — OXYCODONE HYDROCHLORIDE AND ACETAMINOPHEN 2 TABLET: 5; 325 TABLET ORAL at 06:06

## 2017-09-08 RX ADMIN — SIMETHICONE CHEW TAB 80 MG 80 MG: 80 TABLET ORAL at 18:42

## 2017-09-08 RX ADMIN — IBUPROFEN 600 MG: 600 TABLET ORAL at 03:34

## 2017-09-08 RX ADMIN — OXYCODONE HYDROCHLORIDE AND ACETAMINOPHEN 2 TABLET: 5; 325 TABLET ORAL at 18:42

## 2017-09-08 NOTE — PLAN OF CARE
Problem: Patient Care Overview (Adult)  Goal: Plan of Care Review  Outcome: Ongoing (interventions implemented as appropriate)    17 0400   Coping/Psychosocial Response Interventions   Plan Of Care Reviewed With patient   Patient Care Overview   Progress improving   Outcome Evaluation   Outcome Summary/Follow up Plan VSS, assessment WNL, breastfeeding going well, pain managed with PO meds, will continue to monitor.       Goal: Adult Individualization and Mutuality  Outcome: Ongoing (interventions implemented as appropriate)  Goal: Discharge Needs Assessment  Outcome: Ongoing (interventions implemented as appropriate)    Problem: Postpartum ( Delivery) (Adult)  Goal: Signs and Symptoms of Listed Potential Problems Will be Absent or Manageable (Postpartum)  Outcome: Ongoing (interventions implemented as appropriate)    Problem: Breastfeeding (Adult,NICU,[place],Obstetrics,Pediatric)  Goal: Signs and Symptoms of Listed Potential Problems Will be Absent or Manageable (Breastfeeding)  Outcome: Ongoing (interventions implemented as appropriate)

## 2017-09-08 NOTE — PROGRESS NOTES
[facility]   PROGRESS NOTE    Post-Op Day 2 S/P   Subjective     Patient reports:  Pain is well controlled with prescription NSAID's including motrin and narcotic analgesics including percocet.  She is   ambulating. Tolerating diet. Tolerating po -- normal.  Intake -- c/o of tolerating po solids.   Voiding - without difficulty; flatus reported..  Vaginal bleeding is light. C/o several day h/o sinus congestion w/ yellow green drainage and sinus pressure. No shortness of breath. Some productive cought.      Objective      Vitals: Vital Signs Range for the last 24 hours  Temperature: Temp:  [97.5 °F (36.4 °C)-98.7 °F (37.1 °C)] 98.3 °F (36.8 °C)   Temp Source: Temp src: Oral   BP: BP: (114-122)/(69-86) 117/78   Pulse: Heart Rate:  [79-97] 81   Respirations: Resp:  [16-18] 18   SPO2: SpO2:  [97 %-100 %] 97 %   O2 Amount (l/min):     O2 Devices O2 Device: room air   Weight:              Physical Exam    Lungs clear to auscultation bilaterally   Abdomen Soft NT ND, fundus-firm NT below umbilicus   Incision  healing well, no drainage, no erythema, no swelling, well approximated   Extremities extremities normal, atraumatic, no cyanosis or edema     I reviewed the patient's new clinical results.    Assessment/Plan      Active Problems:    Pregnancy    Previous  delivery affecting pregnancy    Cystic fibrosis carrier    Family history of congenital heart defect    Sinusitis      Assessment:    Mary Chandler is Day 2  post-partum  , Low Transverse    .       Plan:  continue post op care.  rx z-lupe, and flonase nasal spray, add claritin      Marine Peterson MD  2017  9:43 AM

## 2017-09-08 NOTE — PROGRESS NOTES
Continued Stay Note  [facility]     Patient Name: Mary Chandler  MRN: [id number]  Today's Date: 9/8/2017    Admit Date: 9/6/2017          Discharge Plan       09/08/17 1039    Case Management/Social Work Plan    Additional Comments Per CPS  (Hemalatha Stevenson, [phone]), baby can be discharged to mother's care and content of mother's case, which is in the process of being closed, does not impact baby's discharge disposition.  informed mother/baby nurse (Racheal) of CPS determination.  to follow for meconium. Moriah Beltrán LCSW              Discharge Codes     None            Manisha Beltrán LCSW

## 2017-09-08 NOTE — LACTATION NOTE
Mom needing assistance waking baby to eat. Baby latched to right breast. Encouraged mom to call if needing further assistance.

## 2017-09-09 PROCEDURE — 99232 SBSQ HOSP IP/OBS MODERATE 35: CPT | Performed by: OBSTETRICS & GYNECOLOGY

## 2017-09-09 RX ADMIN — AZITHROMYCIN 250 MG: 250 TABLET, FILM COATED ORAL at 08:05

## 2017-09-09 RX ADMIN — SIMETHICONE CHEW TAB 80 MG 80 MG: 80 TABLET ORAL at 08:06

## 2017-09-09 RX ADMIN — CETIRIZINE HYDROCHLORIDE 10 MG: 10 TABLET, FILM COATED ORAL at 08:05

## 2017-09-09 RX ADMIN — OXYCODONE HYDROCHLORIDE AND ACETAMINOPHEN 2 TABLET: 5; 325 TABLET ORAL at 18:15

## 2017-09-09 RX ADMIN — DOCUSATE SODIUM 100 MG: 100 CAPSULE, LIQUID FILLED ORAL at 08:05

## 2017-09-09 RX ADMIN — OXYCODONE HYDROCHLORIDE AND ACETAMINOPHEN 2 TABLET: 5; 325 TABLET ORAL at 08:05

## 2017-09-09 RX ADMIN — OXYCODONE HYDROCHLORIDE AND ACETAMINOPHEN 2 TABLET: 5; 325 TABLET ORAL at 12:57

## 2017-09-09 RX ADMIN — IBUPROFEN 600 MG: 600 TABLET ORAL at 08:06

## 2017-09-09 RX ADMIN — IBUPROFEN 600 MG: 600 TABLET ORAL at 18:14

## 2017-09-09 RX ADMIN — OXYCODONE HYDROCHLORIDE AND ACETAMINOPHEN 2 TABLET: 5; 325 TABLET ORAL at 01:16

## 2017-09-09 NOTE — PLAN OF CARE
Problem: Patient Care Overview (Adult)  Goal: Plan of Care Review  Outcome: Ongoing (interventions implemented as appropriate)    17 0400 17 0329   Coping/Psychosocial Response Interventions   Plan Of Care Reviewed With --  patient   Patient Care Overview   Progress --  improving   Outcome Evaluation   Outcome Summary/Follow up Plan VSS, assessment WNL, breastfeeding going well, pain managed with PO meds, will continue to monitor. --        Goal: Adult Individualization and Mutuality  Outcome: Ongoing (interventions implemented as appropriate)  Goal: Discharge Needs Assessment  Outcome: Ongoing (interventions implemented as appropriate)    Problem: Postpartum ( Delivery) (Adult)  Goal: Signs and Symptoms of Listed Potential Problems Will be Absent or Manageable (Postpartum)  Outcome: Ongoing (interventions implemented as appropriate)    Problem: Breastfeeding (Adult,NICU,,Obstetrics,Pediatric)  Goal: Signs and Symptoms of Listed Potential Problems Will be Absent or Manageable (Breastfeeding)  Outcome: Ongoing (interventions implemented as appropriate)

## 2017-09-09 NOTE — LACTATION NOTE
This note was copied from a baby's chart.  Mom reports baby BF well so far and she feels like her milk is coming in. Will call for latch check.

## 2017-09-09 NOTE — PROGRESS NOTES
Section PROGRESS NOTE                                                      2017    CC: Post-Op Day 3 S/P ,   Subjective     Patient reports:  Pain is well controlled with Motrin and Percocet.    She is  ambulating.   Voiding - without difficulty.  She is tolerating her diet,  flatus reported.  Vaginal bleeding is light.                Breast Feeding: Going OK.    Objective            Vitals: Vital Signs Range for the last 24 hours  Temperature: Temp:  [98.1 °F (36.7 °C)-98.2 °F (36.8 °C)] 98.1 °F (36.7 °C)   Temp Source: Temp src: Oral   BP: BP: (107-114)/(72-75) 114/74   Pulse: Heart Rate:  [76-94] 76   Respirations: Resp:  [16-18] 16   SPO2:     O2 Amount (l/min):     O2 Devices     Weight:              Physical Exam  Gen'l Appearance   Awake, alert, comfortable,no distress   Lungs  CVA  Heart Clear to auscultation bilaterally    RR without murmur / gallop.   Abdomen Flat, Soft,  Bowel sounds present.   Incision  no drainage, no erythema, no swelling, well approximated   Extremities Calves not tender, no edema.                   LABS:     Results from last 7 days  Lab Units 17  0549 17  1456   WBC 10*3/mm3 13.89* 14.74*   HEMOGLOBIN g/dL 11.0* 11.7*   HEMATOCRIT % 33.7* 35.9   PLATELETS 10*3/mm3 181 209             Assessment/Plan      Active Problems:    Pregnancy    Previous  delivery affecting pregnancy    Cystic fibrosis carrier    Family history of congenital heart defect    Sinusitis      Assessment:    Mary Chandler is Day 3  Post-Op  Section                  Plans IUD in office.             Plan:  continue post op care.  Home in AM.      Sagar Zimmerman MD  2017  4:18 PM

## 2017-09-09 NOTE — LACTATION NOTE
This note was copied from a baby's chart.  Educated/assisted with deep latch in cross cradle. Mom c/o slight pain at initial latch (d/t previous shallow latches), but says it gets much better. Her breast are filling. Encouraged to call for further assist.

## 2017-09-10 VITALS
HEIGHT: 64 IN | RESPIRATION RATE: 18 BRPM | SYSTOLIC BLOOD PRESSURE: 133 MMHG | TEMPERATURE: 97.6 F | BODY MASS INDEX: 23.9 KG/M2 | HEART RATE: 72 BPM | WEIGHT: 140 LBS | DIASTOLIC BLOOD PRESSURE: 91 MMHG | OXYGEN SATURATION: 97 %

## 2017-09-10 PROCEDURE — 99238 HOSP IP/OBS DSCHRG MGMT 30/<: CPT | Performed by: OBSTETRICS & GYNECOLOGY

## 2017-09-10 RX ORDER — OXYCODONE HYDROCHLORIDE AND ACETAMINOPHEN 5; 325 MG/1; MG/1
1 TABLET ORAL EVERY 4 HOURS PRN
Qty: 28 TABLET | Refills: 0 | Status: SHIPPED | OUTPATIENT
Start: 2017-09-10 | End: 2017-09-26

## 2017-09-10 RX ORDER — IBUPROFEN 600 MG/1
600 TABLET ORAL EVERY 8 HOURS PRN
Qty: 28 TABLET | Refills: 1 | Status: SHIPPED | OUTPATIENT
Start: 2017-09-10 | End: 2022-11-07

## 2017-09-10 RX ADMIN — OXYCODONE HYDROCHLORIDE AND ACETAMINOPHEN 2 TABLET: 5; 325 TABLET ORAL at 08:09

## 2017-09-10 RX ADMIN — IBUPROFEN 600 MG: 600 TABLET ORAL at 12:44

## 2017-09-10 RX ADMIN — OXYCODONE HYDROCHLORIDE AND ACETAMINOPHEN 2 TABLET: 5; 325 TABLET ORAL at 17:15

## 2017-09-10 RX ADMIN — OXYCODONE HYDROCHLORIDE AND ACETAMINOPHEN 2 TABLET: 5; 325 TABLET ORAL at 12:44

## 2017-09-10 RX ADMIN — OXYCODONE HYDROCHLORIDE AND ACETAMINOPHEN 2 TABLET: 5; 325 TABLET ORAL at 00:21

## 2017-09-10 RX ADMIN — DOCUSATE SODIUM 100 MG: 100 CAPSULE, LIQUID FILLED ORAL at 17:15

## 2017-09-10 RX ADMIN — AZITHROMYCIN 250 MG: 250 TABLET, FILM COATED ORAL at 11:42

## 2017-09-10 RX ADMIN — IBUPROFEN 600 MG: 600 TABLET ORAL at 02:58

## 2017-09-10 NOTE — DISCHARGE SUMMARY
OB Discharge Summary C/S    This  female, was admitted on 2017 and underwent a , Low Transverse  on 2017 , resulting in the birth of the following:  Infant         Weight:   Birth Information  YOB: 2015   Time of birth: 6:05 PM   Delivering clinician: Marine Peterson   Sex: male   Delivery type: , Low Transverse   Breech type (if applicable):     Observed anomalies/comments: PANDA OrJose D         Observations/Comments:  BETTY Amos      Apgars: 8   @ 1 minute /    9   @ 5 minutes     LABS:   Lab Results (last 72 hours)     ** No results found for the last 72 hours. **          Post operatively the patient did well. She was tolerating a regular diet, ambulating, voiding and passing flatus. Post op hemoglobin was   Lab Results   Component Value Date    HGB 11.0 (L) 2017   .     On day of discharge, uterus was firm, incision was clean, dry and intact, extremities were non tender with no erythema or masses.     Pt was given prescriptions for Percocet 5/325 and Motrin 800 mg PRN for pain.    Instructed to call the office to make an appointment in 2  and 6  Weeks and to    please call the office, or the OB/GYN on-call if after-hours, with any questions,concerns or  any of the followin) Fever - a temperature greater than 100.4  2) Incresed pain  3) Heavy vaginal bleeding (soaking more than 1 pad in an hour)  4) Foul-smelling discharge from the vagina  5) Red, painful incision or foul-smelling discharge from incision.    She was instructed to  not place anything in the vagina -  Including  tampons, douches or having sex - until after her  6 week postpartum visit to prevent infection.    Wash your incision everyday with warm water and gentle soap. You may pat it dry.

## 2017-09-10 NOTE — PLAN OF CARE
Problem: Patient Care Overview (Adult)  Goal: Plan of Care Review  Outcome: Ongoing (interventions implemented as appropriate)    09/10/17 104   Coping/Psychosocial Response Interventions   Plan Of Care Reviewed With patient   Patient Care Overview   Progress progress toward functional goals is gradual   Outcome Evaluation   Outcome Summary/Follow up Plan VSS,mom latching baby, pumping and supplementing, mod pain relief with po meds, up ad lyle, wants to board as long as baby is a pt here       Goal: Adult Individualization and Mutuality  Outcome: Ongoing (interventions implemented as appropriate)    09/10/17 104   Individualization   Patient Specific Preferences breastfeeding, pumping and supplementing   Patient Specific Goals mom would like to board as long as baby is here   Patient Specific Interventions lactation following       Goal: Discharge Needs Assessment  Outcome: Ongoing (interventions implemented as appropriate)    09/10/17 1047   Discharge Needs Assessment   Concerns To Be Addressed no discharge needs identified   Readmission Within The Last 30 Days no previous admission in last 30 days   Equipment Needed After Discharge none   Discharge Disposition home or self-care   Current Health   Anticipated Changes Related to Illness none   Living Environment   Transportation Available car         Problem: Postpartum ( Delivery) (Adult)  Goal: Signs and Symptoms of Listed Potential Problems Will be Absent or Manageable (Postpartum)  Outcome: Ongoing (interventions implemented as appropriate)    09/10/17 104   Postpartum ( Delivery)   Problems Assessed (Postpartum ) all   Problems Present (Postpartum ) pain         Problem: Breastfeeding (Adult,NICU,[place],Obstetrics,Pediatric)  Goal: Signs and Symptoms of Listed Potential Problems Will be Absent or Manageable (Breastfeeding)  Outcome: Ongoing (interventions implemented as appropriate)

## 2017-09-10 NOTE — LACTATION NOTE
This note was copied from a baby's chart.  Baby has 10% weight loss and dehydrated. Mom will latch today then pump and feed back to baby every 3 hrs. She is also supplementing with formula per MD. Encouraged to call for latch check.

## 2017-09-13 NOTE — PROGRESS NOTES
Continued Stay Note  [facility]     Patient Name: Mary Chandler  MRN: [id number]  Today's Date: 9/13/2017    Admit Date: 9/6/2017          Discharge Plan       09/13/17 1022    Case Management/Social Work Plan    Additional Comments Per chart review, meconium screen negative.  left [facility] for CPS  (Hemalatha Stevenson, [phone]). Mother and baby discharged. No additional known needs. Moriah Beltrán LCSW              Discharge Codes     None        Expected Discharge Date and Time     Expected Discharge Date Expected Discharge Time    Sep 10, 2017             Manisha Beltrán LCSW

## 2017-09-14 ENCOUNTER — TELEPHONE (OUTPATIENT)
Dept: OBSTETRICS AND GYNECOLOGY | Age: 22
End: 2017-09-14

## 2017-09-20 ENCOUNTER — POSTPARTUM VISIT (OUTPATIENT)
Dept: OBSTETRICS AND GYNECOLOGY | Age: 22
End: 2017-09-20

## 2017-09-20 VITALS
SYSTOLIC BLOOD PRESSURE: 112 MMHG | WEIGHT: 124 LBS | DIASTOLIC BLOOD PRESSURE: 72 MMHG | BODY MASS INDEX: 21.17 KG/M2 | HEIGHT: 64 IN

## 2017-09-20 DIAGNOSIS — Z98.890 POST-OPERATIVE STATE: Primary | ICD-10-CM

## 2017-09-20 PROCEDURE — 99213 OFFICE O/P EST LOW 20 MIN: CPT | Performed by: OBSTETRICS & GYNECOLOGY

## 2017-09-20 NOTE — PROGRESS NOTES
"Tulio Chandler is a 22 y.o. female who presents to the clinic 2 weeks status post repeat C/S at 38 6/7wk gest in labor. Baby boy doing well. She is eating a regular diet without difficulty. Bowel movements are normal. Pain is controlled without any medications.  Scant lochia. Started on Zoloft 7 days ago for postpartum depression. Feeling much better, less tearful. Fewer crying episodes and irritability.    The following portions of the patient's history were reviewed and updated as appropriate: allergies, current medications, past family history, past medical history, past social history, past surgical history and problem list.    Review of Systems  Constitutional: negative  Eyes: negative  Ears, nose, mouth, throat, and face: negative  Respiratory: negative  Cardiovascular: negative  Gastrointestinal: negative  Genitourinary:negative  Musculoskeletal:negative  Neurological: negative  Behavioral/Psych: negative      Objective     /72  Ht 64\" (162.6 cm)  Wt 124 lb (56.2 kg)  LMP 12/03/2016 (Exact Date)  Breastfeeding? No  BMI 21.28 kg/m2  General:  alert, appears stated age and cooperative   Abdomen: Soft, non-tender, ND   Incision:   healing well, no drainage, no erythema, no hernia, no seroma, no swelling, no dehiscence, incision well approximated         Assessment      Doing well postoperatively.  Operative findings again reviewed. Pathology report discussed.    postpartum depression  Plan     1. Continue any current medications.  2. Wound care discussed.  3. Activity restrictions: continue pelvic rest and no lifting more than 10 lb  4. Anticipated return to work: 8wks  5. Follow up: 4 weeks   6. Continue zoloft 50mg daily  "

## 2017-10-18 ENCOUNTER — POSTPARTUM VISIT (OUTPATIENT)
Dept: OBSTETRICS AND GYNECOLOGY | Age: 22
End: 2017-10-18

## 2017-10-18 VITALS
HEIGHT: 64 IN | DIASTOLIC BLOOD PRESSURE: 80 MMHG | WEIGHT: 121 LBS | SYSTOLIC BLOOD PRESSURE: 118 MMHG | BODY MASS INDEX: 20.66 KG/M2

## 2017-10-18 DIAGNOSIS — Z30.011 ENCOUNTER FOR INITIAL PRESCRIPTION OF CONTRACEPTIVE PILLS: ICD-10-CM

## 2017-10-18 DIAGNOSIS — Z11.3 SCREEN FOR STD (SEXUALLY TRANSMITTED DISEASE): ICD-10-CM

## 2017-10-18 PROBLEM — Z14.1 CYSTIC FIBROSIS CARRIER: Status: RESOLVED | Noted: 2017-01-27 | Resolved: 2017-10-18

## 2017-10-18 PROBLEM — Z34.90 PREGNANCY: Status: RESOLVED | Noted: 2017-09-06 | Resolved: 2017-10-18

## 2017-10-18 PROBLEM — A74.9 CHLAMYDIA INFECTION AFFECTING PREGNANCY IN FIRST TRIMESTER, ANTEPARTUM: Status: RESOLVED | Noted: 2017-02-01 | Resolved: 2017-10-18

## 2017-10-18 PROBLEM — O34.219 PREVIOUS CESAREAN DELIVERY AFFECTING PREGNANCY: Status: RESOLVED | Noted: 2017-01-27 | Resolved: 2017-10-18

## 2017-10-18 PROBLEM — O98.811 CHLAMYDIA INFECTION AFFECTING PREGNANCY IN FIRST TRIMESTER, ANTEPARTUM: Status: RESOLVED | Noted: 2017-02-01 | Resolved: 2017-10-18

## 2017-10-18 PROBLEM — Z14.1 CYSTIC FIBROSIS CARRIER: Status: RESOLVED | Noted: 2017-09-06 | Resolved: 2017-10-18

## 2017-10-18 PROBLEM — Z82.79 FAMILY HISTORY OF CONGENITAL HEART DEFECT: Status: RESOLVED | Noted: 2017-09-06 | Resolved: 2017-10-18

## 2017-10-18 PROBLEM — O34.219 PREVIOUS CESAREAN DELIVERY AFFECTING PREGNANCY: Status: RESOLVED | Noted: 2017-09-06 | Resolved: 2017-10-18

## 2017-10-18 PROBLEM — Z82.79 FAMILY HISTORY OF CONGENITAL HEART DEFECT: Status: RESOLVED | Noted: 2017-01-27 | Resolved: 2017-10-18

## 2017-10-18 PROBLEM — J32.9 SINUSITIS: Status: RESOLVED | Noted: 2017-09-08 | Resolved: 2017-10-18

## 2017-10-18 PROCEDURE — 99213 OFFICE O/P EST LOW 20 MIN: CPT | Performed by: OBSTETRICS & GYNECOLOGY

## 2017-10-18 RX ORDER — NORETHINDRONE ACETATE AND ETHINYL ESTRADIOL AND FERROUS FUMARATE 1MG-20(24)
1 KIT ORAL DAILY
Qty: 28 TABLET | Refills: 12 | Status: SHIPPED | OUTPATIENT
Start: 2017-10-18 | End: 2018-10-18

## 2017-10-18 NOTE — PROGRESS NOTES
"Tulio Chandler is a 22 y.o. female who presents for a postpartum visit. She is 6 weeks postpartum following a low cervical transverse  section. I have fully reviewed the prenatal and intrapartum course. The delivery was at 38 6/7 gestational weeks. Outcome: repeat  section, low transverse incision. Anesthesia: spinal. Postpartum course has been complicated by postpartum depression. Baby's course has been benign. Baby is feeding by bottle -  . Bleeding resolved and on menses now. Bowel function is normal. Bladder function is normal. Patient is not sexually active. Contraception method is OCP (estrogen/progesterone). Postpartum depression screening: positive but feeling much better now. Started Zoloft a few weeks ago for postpartum depression.     The following portions of the patient's history were reviewed and updated as appropriate: allergies, current medications, past family history, past medical history, past social history, past surgical history and problem list.    Review of Systems  Genitourinary:positive for moderate bleeding currently    Objective   /80  Ht 64\" (162.6 cm)  Wt 121 lb (54.9 kg)  LMP 2016 (Exact Date)  BMI 20.77 kg/m2   General:  alert, appears stated age and cooperative    Breasts:  inspection negative, no nipple discharge or bleeding, no masses or nodularity palpable   Lungs: clear to auscultation bilaterally   Heart:  regular rate and rhythm, S1, S2 normal, no murmur, click, rub or gallop   Abdomen: soft, non-tender; bowel sounds normal; no masses,  no organomegaly    Vulva:  normal   Vagina: normal vagina   Cervix:  no cervical motion tenderness, no lesions and nulliparous appearance   Corpus: normal size, contour, position, consistency, mobility, non-tender, anteverted and mobile   Adnexa:  normal adnexa and no mass, fullness, tenderness   Rectal Exam: Not performed.     Assessment/Plan   normal postpartum exam. Pap smear not done at today's " visit.    1. Contraception: OCP (estrogen/progesterone)  2. Postpartum depression- better w/ zoloft, desires to continue to take all year    3. Follow up in: 1 yr or as needed.    4. Discussed Mirena IUD, info given, pt will consider, declines Nexplanon  5. Resume normal activity as tolerates, ok to return to work

## 2017-10-20 LAB
C TRACH RRNA SPEC QL NAA+PROBE: NEGATIVE
N GONORRHOEA RRNA SPEC QL NAA+PROBE: NEGATIVE

## 2017-10-23 ENCOUNTER — TELEPHONE (OUTPATIENT)
Dept: OBSTETRICS AND GYNECOLOGY | Age: 22
End: 2017-10-23

## 2018-01-30 ENCOUNTER — TELEPHONE (OUTPATIENT)
Dept: OBSTETRICS AND GYNECOLOGY | Age: 23
End: 2018-01-30

## 2022-10-31 ENCOUNTER — TELEPHONE (OUTPATIENT)
Dept: OBSTETRICS AND GYNECOLOGY | Age: 27
End: 2022-10-31

## 2022-11-05 ENCOUNTER — HOSPITAL ENCOUNTER (EMERGENCY)
Facility: HOSPITAL | Age: 27
Discharge: HOME | End: 2022-11-05
Attending: OBSTETRICS & GYNECOLOGY | Admitting: OBSTETRICS & GYNECOLOGY

## 2022-11-05 VITALS
SYSTOLIC BLOOD PRESSURE: 106 MMHG | HEIGHT: 64 IN | BODY MASS INDEX: 24.07 KG/M2 | RESPIRATION RATE: 16 BRPM | OXYGEN SATURATION: 100 % | HEART RATE: 111 BPM | WEIGHT: 141 LBS | DIASTOLIC BLOOD PRESSURE: 75 MMHG

## 2022-11-05 LAB — QT INTERVAL: 308 MS

## 2022-11-05 PROCEDURE — 99283 EMERGENCY DEPT VISIT LOW MDM: CPT | Performed by: OBSTETRICS & GYNECOLOGY

## 2022-11-05 PROCEDURE — 59025 FETAL NON-STRESS TEST: CPT

## 2022-11-05 PROCEDURE — 93010 ELECTROCARDIOGRAM REPORT: CPT | Performed by: INTERNAL MEDICINE

## 2022-11-05 PROCEDURE — 93005 ELECTROCARDIOGRAM TRACING: CPT | Performed by: OBSTETRICS & GYNECOLOGY

## 2022-11-05 RX ORDER — PRENATAL VIT NO.126/IRON/FOLIC 28MG-0.8MG
TABLET ORAL DAILY
COMMUNITY

## 2022-11-05 NOTE — OBED NOTES
[facility] Note OB        Patient Name: Mary Bagley  YOB: 1995  MRN: [id number]  Admission Date: 2022  3:54 PM  Date of Service: 2022    Chief Complaint: Decreased Fetal Movement (Pt presents to [facility] with c/o decreased FM over the last week. Denies contractions, LOF, VB, or any prenatal complications. Pt has h/o C/S x 2. Reports only 1 prenatal visit.)        Subjective     Mary Bagley is a 27 y.o. female  at 33w2d with Estimated Date of Delivery: 22 who presents with the chief complaint listed above.  She sees Loretta Soto MD for her prenatal care. Her pregnancy has been complicated by:   no prenatal care, prior  x 2, history of oligohydramnios in prior pregnancy .    Patient with lack of care this pregnancy for various social reasons.  She has now established care and has her first visit with Dr. Lantigua in two days.  She states she had an 8 week ultrasound at a resource center in [facility] that puts her due date on .  She is here today for decreased fetal movement.  She is very anxious and also reports palpitations occasionally at night time.  She denies a cardiac history.      She describes fetal movement as decreased.  She denies rupture of membranes.  She denies vaginal bleeding. She is not feeling contractions.          Objective   There are no problems to display for this patient.       OB History    Para Term  AB Living   3 2 2 0 0 2   SAB IAB Ectopic Molar Multiple Live Births   0 0 0 0 0 2      # Outcome Date GA Lbr Jeet/2nd Weight Sex Delivery Anes PTL Lv   3 Current            2 Term 17 38w6d  3185 g (7 lb 0.4 oz) M CS-LTranv Spinal N CHRISTOPHER      Birth Comments: BETTY Amos      Name: JEOVANNY BAGLEY      Apgar1: 8  Apgar5: 9   1 Term 16 37w1d  2540 g (5 lb 9.6 oz) F CS-LTranv Spinal N CHRISTOPHER      Complications: IUGR (intrauterine growth restriction) affecting care of mother, Oligohydramnios in pacheco pregnancy  in third trimester, Breech presentation      Apgar1: 8  Apgar5: 9        Past Medical History:   Diagnosis Date    Anxiety     does not take anything    Asthma     Depression     current, denies any thoughts of self-harm, not medicated    Ovarian cyst        Past Surgical History:   Procedure Laterality Date    ADENOIDECTOMY      APPENDECTOMY       SECTION N/A 3/16/2016    Procedure:  SECTION PRIMARY;  Surgeon: Marine Peterson MD;  Location: [facility] LABOR DELIVERY;  Service:      SECTION N/A 2017    Procedure:  SECTION REPEAT;  Surgeon: Marine Peterson MD;  Location: [facility] LABOR DELIVERY;  Service:     TONSILLECTOMY         No current facility-administered medications on file prior to encounter.     Current Outpatient Medications on File Prior to Encounter   Medication Sig Dispense Refill    ibuprofen (ADVIL,MOTRIN) 600 MG tablet Take 1 tablet by mouth Every 8 (Eight) Hours As Needed for Mild Pain . 28 tablet 1    prenatal vitamin (prenatal, CLASSIC, vitamin) tablet Take  by mouth Daily.         No Known Allergies    Family History   Problem Relation Age of Onset    Heart defect Brother     Heart defect Sister     Congenital heart disease Neg Hx        Social History     Socioeconomic History    Marital status: Single   Tobacco Use    Smoking status: Never    Smokeless tobacco: Never   Substance and Sexual Activity    Alcohol use: No    Drug use: No    Sexual activity: Yes     Partners: Male     Birth control/protection: None           Review of Systems   Constitutional: Negative for chills, fatigue and fever.   HENT: Negative for congestion, rhinorrhea and sore throat.    Eyes: Negative for visual disturbance.   Respiratory: Negative.    Cardiovascular: Positive for palpitations.   Gastrointestinal: Negative for abdominal pain, constipation, diarrhea, nausea and vomiting.   Genitourinary: Negative for difficulty urinating, dyspareunia, dysuria, flank pain, frequency,  "genital sores, hematuria, pelvic pain, urgency, vaginal bleeding, vaginal discharge and vaginal pain.   Neurological: Negative for dizziness, seizures, light-headedness and headaches.   Psychiatric/Behavioral: Negative for sleep disturbance. The patient is not nervous/anxious.           PHYSICAL EXAM:      VITAL SIGNS:  Vitals:    22 1619   Weight: 64 kg (141 lb)   Height: 162.6 cm (64\")        FHT'S:                   Baseline:  135 BPM  Variability:  Moderate = 6 - 25 BPM  Accelerations:  15 x 15 accelerations present     Decelerations:  absent  Contractions:   absent     Interpretation:    Reactive NST, CAT 1 tracing        PHYSICAL EXAM:    General: well developed; well nourished  no acute distress   Heart: Not performed.   Lungs  : breathing is unlabored     Abdomen: soft, non-tender; no masses  no umbilical or inguinal hernias are present  no hepato-splenomegaly       Cervix: was not checked.      Contractions: none        Extremities: peripheral pulses normal, no pedal edema, no clubbing or cyanosis      Abdominal US: Fetus cephalic; large pockets of amniotic fluid noted with MVP 9 cm.  FCA visualized.  No obvious gross abnormalities.  Anterior placenta.    LABS AND TESTING ORDERED:  Uterine and fetal monitoring  EKG    LAB RESULTS:    No results found for this or any previous visit (from the past 24 hour(s)).    Lab Results   Component Value Date    ABO A 2017    RH Positive 2017       Lab Results   Component Value Date    STREPGPB Positive (A) 2017                 Assessment & Plan     ASSESSMENT/PLAN:  Mary Chandler is a 27 y.o. female  at 33w2d who presented with: decreased fetal movement.  Fetal status reassuring with reactive tracing and no abnormalities seen on US.  She is very anxious and was tachycardic upon admission to the 130s but once she calmed down heart rate was in the low 100s, which is normal in pregnancy.  An EKG was done and showed some sinus tachycardia but " "was otherwise normal.  It was explained to her that an elevated heart rate can be normal due to pregnancy physiology but she can follow up with her OB/GYN regarding this.  She was reassured and discharged home.          Final Impression:  Pregnancy at 33w2d  Reactive NST.  CAT 1 tracing  Maternal vital signs were reviewed and were unremarkable              Vitals:    11/05/22 1619   Weight: 64 kg (141 lb)   Height: 162.6 cm (64\")       Lab Results   Component Value Date    STREPGPB Positive (A) 08/23/2017     Lab Results   Component Value Date    ABO A 09/06/2017    RH Positive 09/06/2017     COVID - 19 status unknown      PLAN:       I have spent 30 minutes including face to face time with the patient, greater than 50% in discussion of the diagnosis (counseling) and/or coordination of care.     Loretta Soto MD  11/5/2022  16:51 EDT  OB Hospitalist  Phone:  [phone]  "

## 2022-11-07 ENCOUNTER — INITIAL PRENATAL (OUTPATIENT)
Dept: OBSTETRICS AND GYNECOLOGY | Age: 27
End: 2022-11-07

## 2022-11-07 VITALS — BODY MASS INDEX: 24.65 KG/M2 | SYSTOLIC BLOOD PRESSURE: 110 MMHG | WEIGHT: 143.6 LBS | DIASTOLIC BLOOD PRESSURE: 64 MMHG

## 2022-11-07 DIAGNOSIS — Z36.85 ANTENATAL SCREENING FOR STREPTOCOCCUS B: ICD-10-CM

## 2022-11-07 DIAGNOSIS — Z98.891 PREVIOUS CESAREAN SECTION: ICD-10-CM

## 2022-11-07 DIAGNOSIS — Z36.86 ENCOUNTER FOR ANTENATAL SCREENING FOR CERVICAL LENGTH: ICD-10-CM

## 2022-11-07 DIAGNOSIS — O09.30 LATE PRENATAL CARE: Primary | ICD-10-CM

## 2022-11-07 DIAGNOSIS — Z82.79 FAMILY HISTORY OF CONGENITAL HEART DEFECT: ICD-10-CM

## 2022-11-07 DIAGNOSIS — F41.9 ANXIETY: ICD-10-CM

## 2022-11-07 DIAGNOSIS — Z34.90 PREGNANCY WITH UNCERTAIN DATES, ANTEPARTUM: ICD-10-CM

## 2022-11-07 DIAGNOSIS — Z20.2 EXPOSURE TO STD: ICD-10-CM

## 2022-11-07 DIAGNOSIS — Z36.89 ENCOUNTER FOR FETAL ANATOMIC SURVEY: ICD-10-CM

## 2022-11-07 LAB
GLUCOSE UR STRIP-MCNC: NEGATIVE MG/DL
PROT UR STRIP-MCNC: NEGATIVE MG/DL
VZV IGG SER QL: NORMAL

## 2022-11-07 PROCEDURE — 76805 OB US >/= 14 WKS SNGL FETUS: CPT | Performed by: OBSTETRICS & GYNECOLOGY

## 2022-11-07 PROCEDURE — 76819 FETAL BIOPHYS PROFIL W/O NST: CPT | Performed by: OBSTETRICS & GYNECOLOGY

## 2022-11-07 PROCEDURE — 99203 OFFICE O/P NEW LOW 30 MIN: CPT | Performed by: OBSTETRICS & GYNECOLOGY

## 2022-11-07 NOTE — PROGRESS NOTES
CC: OB visit  HPI: pt presents for initial OB visit. She has not had prenatal care but notes she had a visit with a local pregnancy resource center when she was in the first trimester. She has a picture of this u/s. She denies regular ctx, vag bleeding or leaking fluid. She went to L&D for decreased fetal movement over the weekend but notes normal movement since that time.     ROS:  Const: neg for fever/chills  CV: pos for increased heart rate noted during L&D visit, neg for chest pain or soa  : neg for reg ctx, vag bleeding or leaking fluid    O: gen: pt in no distress  Heart: mildly tachycardic with reg rhythm  Lungs: clear bilaterally  Abd: gravid, nontender, FHT's 135, FH= 37 cm  Ext: no edema or cords    U/s: composite dating from u/s today - 37 weeks  Limited anatomy appears normal.       A/p: late pnc: during visit, pt notes she had an u/s at 8 weeks at a preg resource center. She had a picture in her phone. This was reviewed and u/s picture had pt's name listed and said 8 weeks 2 days on 5/17/22. Image appeared consistent with 8 weeks.  Discussed that current u/s does not agree with early imaging. Attempted to obtain further information from center but they are not open today. Reviewed verbally with MFM. She advised to use first trimester u/s results as this should be most accurate for now and try to obtain a more official report if possible. She will schedule pt for targeted u/s as well given family hx CHD. She will also closely evaluate regarding dating discrepancy. Plan weekly BPP through delivery. Advised daily fmc's. Ordered pnlabs, hgbA1c, ucx and urine drug screen today. Also obtained cervical STD screen with GBS in case pt is term pregnancy. Recommend she return tomorrow for one hour glucose. Labor warnings reviewed. Continue pnv. Pt reports she taking occasionally. Offered to send rx but she notes she has plenty at home. Recommend she take daily. Recommend f/u MD visit 1 week with BPP.    Prior  c/s X 2: pt plans repeat c/s. Discussed tubal sterilization, she wants to consider.

## 2022-11-08 ENCOUNTER — TELEPHONE (OUTPATIENT)
Dept: OBSTETRICS AND GYNECOLOGY | Age: 27
End: 2022-11-08

## 2022-11-08 ENCOUNTER — TELEPHONE (OUTPATIENT)
Dept: ULTRASOUND IMAGING | Facility: HOSPITAL | Age: 27
End: 2022-11-08

## 2022-11-08 LAB
ABO GROUP BLD: ABNORMAL
AMPHETAMINES UR QL SCN: NEGATIVE NG/ML
BARBITURATES UR QL SCN: NEGATIVE NG/ML
BASOPHILS # BLD AUTO: 0 X10E3/UL (ref 0–0.2)
BASOPHILS NFR BLD AUTO: 0 %
BENZODIAZ UR QL SCN: NEGATIVE NG/ML
BLD GP AB SCN SERPL QL: NEGATIVE
BZE UR QL SCN: NEGATIVE NG/ML
CANNABINOIDS UR QL SCN: NEGATIVE NG/ML
CREAT UR-MCNC: 94.2 MG/DL (ref 20–300)
EOSINOPHIL # BLD AUTO: 0.1 X10E3/UL (ref 0–0.4)
EOSINOPHIL NFR BLD AUTO: 1 %
ERYTHROCYTE [DISTWIDTH] IN BLOOD BY AUTOMATED COUNT: 13.1 % (ref 11.7–15.4)
HBA1C MFR BLD: 5.2 % (ref 4.8–5.6)
HBV SURFACE AG SERPL QL IA: NEGATIVE
HCT VFR BLD AUTO: 31.9 % (ref 34–46.6)
HCV AB S/CO SERPL IA: 0.1 S/CO RATIO (ref 0–0.9)
HGB BLD-MCNC: 10.5 G/DL (ref 11.1–15.9)
HIV 1+2 AB+HIV1 P24 AG SERPL QL IA: NON REACTIVE
IMM GRANULOCYTES # BLD AUTO: 0.1 X10E3/UL (ref 0–0.1)
IMM GRANULOCYTES NFR BLD AUTO: 2 %
LABORATORY COMMENT REPORT: NORMAL
LYMPHOCYTES # BLD AUTO: 1.7 X10E3/UL (ref 0.7–3.1)
LYMPHOCYTES NFR BLD AUTO: 18 %
MCH RBC QN AUTO: 28.8 PG (ref 26.6–33)
MCHC RBC AUTO-ENTMCNC: 32.9 G/DL (ref 31.5–35.7)
MCV RBC AUTO: 87 FL (ref 79–97)
METHADONE UR QL SCN: NEGATIVE NG/ML
MONOCYTES # BLD AUTO: 0.7 X10E3/UL (ref 0.1–0.9)
MONOCYTES NFR BLD AUTO: 8 %
NEUTROPHILS # BLD AUTO: 6.8 X10E3/UL (ref 1.4–7)
NEUTROPHILS NFR BLD AUTO: 71 %
OPIATES UR QL SCN: NEGATIVE NG/ML
OXYCODONE+OXYMORPHONE UR QL SCN: NEGATIVE NG/ML
PCP UR QL: NEGATIVE NG/ML
PH UR: 6.3 [PH] (ref 4.5–8.9)
PLATELET # BLD AUTO: 220 X10E3/UL (ref 150–450)
PROPOXYPH UR QL SCN: NEGATIVE NG/ML
RBC # BLD AUTO: 3.65 X10E6/UL (ref 3.77–5.28)
RH BLD: POSITIVE
RPR SER QL: NON REACTIVE
RUBV IGG SERPL IA-ACNC: 1.87 INDEX
WBC # BLD AUTO: 9.4 X10E3/UL (ref 3.4–10.8)

## 2022-11-08 RX ORDER — FERROUS SULFATE 325(65) MG
325 TABLET ORAL
Qty: 90 TABLET | Refills: 1 | Status: SHIPPED | OUTPATIENT
Start: 2022-11-08

## 2022-11-08 NOTE — TELEPHONE ENCOUNTER
I tried to contact patient yesterday regarding appointment this morning but patient never returned the call.  I tried to contact patient again this morning when she did not come in and still had to leave a message.  I called the physician office to inform them that I could not get in touch with the patient.  They will try and if they get hold of her they will have her call us./js

## 2022-11-08 NOTE — TELEPHONE ENCOUNTER
Spoke with patient Mother, Informed patient that I reached out to [facility] to obtain records from the patient visit. Notified pt mother that she would need to go up there and sign a record release form in order for us to have records. Patient mother agrees to take patient up the [facility] to sign form so we can obtain records.

## 2022-11-09 ENCOUNTER — TELEPHONE (OUTPATIENT)
Dept: OBSTETRICS AND GYNECOLOGY | Age: 27
End: 2022-11-09

## 2022-11-09 LAB
BACTERIA UR CULT: NORMAL
BACTERIA UR CULT: NORMAL
C TRACH RRNA SPEC QL NAA+PROBE: NEGATIVE
GP B STREP DNA SPEC QL NAA+PROBE: NEGATIVE
N GONORRHOEA RRNA SPEC QL NAA+PROBE: NEGATIVE
T VAGINALIS RRNA SPEC QL NAA+PROBE: NEGATIVE

## 2022-11-09 NOTE — TELEPHONE ENCOUNTER
Unable to reach patient re: [facility] consult.  They had her scheduled yesterday for urgent appt but they never could reach her to confirm, and we could not reach her either.    Pt also no-showed her GTT yesterday that she scheduled herself at checkout the day prior.

## 2022-11-09 NOTE — TELEPHONE ENCOUNTER
LVM on mother's phone number for her to have pt call us re: MFM and GTT.  Can call MFM at [phone] to schedule, or call us if she has questions.

## 2022-11-14 ENCOUNTER — ROUTINE PRENATAL (OUTPATIENT)
Dept: OBSTETRICS AND GYNECOLOGY | Age: 27
End: 2022-11-14

## 2022-11-14 VITALS — WEIGHT: 142.4 LBS | SYSTOLIC BLOOD PRESSURE: 110 MMHG | BODY MASS INDEX: 24.44 KG/M2 | DIASTOLIC BLOOD PRESSURE: 62 MMHG

## 2022-11-14 DIAGNOSIS — Z3A.34 34 WEEKS GESTATION OF PREGNANCY: Primary | ICD-10-CM

## 2022-11-14 DIAGNOSIS — O99.019 MATERNAL ANEMIA IN PREGNANCY, ANTEPARTUM: ICD-10-CM

## 2022-11-14 LAB
GLUCOSE UR STRIP-MCNC: NEGATIVE MG/DL
PROT UR STRIP-MCNC: NEGATIVE MG/DL

## 2022-11-14 PROCEDURE — 99213 OFFICE O/P EST LOW 20 MIN: CPT | Performed by: OBSTETRICS & GYNECOLOGY

## 2022-11-14 PROCEDURE — 59025 FETAL NON-STRESS TEST: CPT | Performed by: OBSTETRICS & GYNECOLOGY

## 2022-11-14 NOTE — PROGRESS NOTES
CC: OB visit  HPI: pt presents for scheduled visit. She denies reg ctx, vag bleeding or leaking fluid. She notes she picked up her records from early pregnancy u/s but left them at home.   ROS:  : neg for vag bleeding, leaking fluid or reg ctx  GI: neg for n/v    O: BPP 6/8, -2 for sustained breathing  NST: monitored for 21 minutes  Indication: late/scant prenatal care  Interpretation: Reactive,  baseline 120 bpm, no regular ctx or significant decelerations.  Plan: repeat BPP tomorrow    A/p: late/ insuff prenatal care: advised daily fmc's and continue weekly BPP. Pt missed appt for one hour, she will complete today. Will attempt to obtain additional records of early u/s today.     34 weeks: recommend tdap and flu vaccine; pt declines    LGA/mild polyhydramnios: reviewed available dating options verbally with MFM. She recommended using first trimester u/s dating and plans to trend at her [facility] exam. Mild polyhydramnios noted today as well, will f/u one hour and pt has appt with mfm scheduled. Encouraged her to attend this appt and she notes agreement.      Prior c/s: pt desires repeat c/s. She declines tubal.    Anemia: pt has not started iron supplement as prescribed. She agrees to  rx today. Encourage her to start asap. Additional anemia labs ordered today for further work up.    BPP 6/8: NST reassuring, recommend repeat BPP tomorrow

## 2022-11-15 ENCOUNTER — ROUTINE PRENATAL (OUTPATIENT)
Dept: OBSTETRICS AND GYNECOLOGY | Age: 27
End: 2022-11-15

## 2022-11-15 ENCOUNTER — PREP FOR SURGERY (OUTPATIENT)
Dept: OTHER | Facility: HOSPITAL | Age: 27
End: 2022-11-15

## 2022-11-15 VITALS — DIASTOLIC BLOOD PRESSURE: 82 MMHG | BODY MASS INDEX: 24.31 KG/M2 | SYSTOLIC BLOOD PRESSURE: 112 MMHG | WEIGHT: 141.6 LBS

## 2022-11-15 DIAGNOSIS — Z98.891 PREVIOUS CESAREAN SECTION: Primary | ICD-10-CM

## 2022-11-15 DIAGNOSIS — Z3A.34 34 WEEKS GESTATION OF PREGNANCY: Primary | ICD-10-CM

## 2022-11-15 LAB
ERYTHROCYTE [DISTWIDTH] IN BLOOD BY AUTOMATED COUNT: 13.3 % (ref 11.7–15.4)
FERRITIN SERPL-MCNC: 14 NG/ML (ref 15–150)
FOLATE SERPL-MCNC: 13.2 NG/ML
GLUCOSE 1H P 50 G GLC PO SERPL-MCNC: 122 MG/DL (ref 70–139)
HCT VFR BLD AUTO: 32.6 % (ref 34–46.6)
HGB BLD-MCNC: 10.4 G/DL (ref 11.1–15.9)
IRON SATN MFR SERPL: 6 % (ref 15–55)
IRON SERPL-MCNC: 30 UG/DL (ref 27–159)
MCH RBC QN AUTO: 27 PG (ref 26.6–33)
MCHC RBC AUTO-ENTMCNC: 31.9 G/DL (ref 31.5–35.7)
MCV RBC AUTO: 85 FL (ref 79–97)
PLATELET # BLD AUTO: 232 X10E3/UL (ref 150–450)
RBC # BLD AUTO: 3.85 X10E6/UL (ref 3.77–5.28)
TIBC SERPL-MCNC: 523 UG/DL (ref 250–450)
UIBC SERPL-MCNC: 493 UG/DL (ref 131–425)
VIT B12 SERPL-MCNC: 230 PG/ML (ref 232–1245)
WBC # BLD AUTO: 10.3 X10E3/UL (ref 3.4–10.8)

## 2022-11-15 PROCEDURE — 99213 OFFICE O/P EST LOW 20 MIN: CPT | Performed by: OBSTETRICS & GYNECOLOGY

## 2022-11-15 NOTE — PROGRESS NOTES
CC: ob visit  HPI: pt presents for scheduled f/u u/s.   ROS:   GI: neg for n/v  : Neg for ctx, vag bleeding/leaking fluid  O: FH= 37 cm, FHT's 136 bpm  Ext: no edema or cords  BPP today= 8/8    A/p: late pnc: reassuring BPP today, continue daily fmc's and weekly BPP  Pt brought records from outside u/s. EDC of 12/21/22 noted with report based on 8 week u/s. Dating revised to reflect this information.     LGA/family hx CHD: pt has targeted u/s with mfm next week    Anemia: pt started iron today. Also recommended B12 and sent rx. Recommended iron infusions. Pt considered but declines at this time.    Hx  C/s: pt declines tubal, plans repeat

## 2022-11-17 LAB
GLUCOSE UR STRIP-MCNC: NEGATIVE MG/DL
PROT UR STRIP-MCNC: NEGATIVE MG/DL

## 2022-11-20 NOTE — PROGRESS NOTES
MATERNAL FETAL MEDICINE Consult Note    Dear Dr Annette Lantigua MD:    Thank you for your kind referral of Mary Chandler.  As you know, she is a 27 y.o.   at 36 1 /7 weeks gestation (Estimated Date of Delivery: 22). This is a consult.      Her antepartum course is complicated by:  Late prenatal care  FH CHD    Aneuploidy Screening: declined      HPI: Today, she denies headache, blurry vision, RUQ pain. No vaginal bleeding, no contractions.     Review of History:  Past Medical History:   Diagnosis Date   • Anxiety     does not take anything   • Asthma     childhood   • Depression     current, denies any thoughts of self-harm, not medicated   • Ovarian cyst      Past Surgical History:   Procedure Laterality Date   • ADENOIDECTOMY     • APPENDECTOMY     •  SECTION N/A 3/16/2016    Procedure:  SECTION PRIMARY;  Surgeon: Marine Peterson MD;  Location: [facility] LABOR DELIVERY;  Service:    •  SECTION N/A 2017    Procedure:  SECTION REPEAT;  Surgeon: Marine Peterson MD;  Location: [facility] LABOR DELIVERY;  Service:    • TONSILLECTOMY         OB Hx:    Social History     Socioeconomic History   • Marital status: Single   Tobacco Use   • Smoking status: Never   • Smokeless tobacco: Never   Vaping Use   • Vaping Use: Never used   Substance and Sexual Activity   • Alcohol use: No   • Drug use: No   • Sexual activity: Yes     Partners: Male     Birth control/protection: None     Family History   Problem Relation Age of Onset   • Heart defect Brother    • Heart defect Sister    • Congenital heart disease Neg Hx    • Breast cancer Neg Hx    • Ovarian cancer Neg Hx    • Uterine cancer Neg Hx    • Colon cancer Neg Hx       No Known Allergies   Current Outpatient Medications on File Prior to Visit   Medication Sig Dispense Refill   • ferrous sulfate 325 (65 FE) MG tablet Take 1 tablet by mouth 3 (Three) Times a Day With Meals. 90 tablet 1   • cyanocobalamin (V-R VITAMIN B-12)  500 MCG tablet Take 1 tablet by mouth Daily. 30 tablet 1   • prenatal vitamin (prenatal, CLASSIC, vitamin) tablet Take  by mouth Daily.       No current facility-administered medications on file prior to visit.        Past obstetric, gynecological, medical, surgical, family and social history reviewed.  Relevant lab work and imaging reviewed.    Review of systems  Constitutional:  denies fever, chills, malaise.   ENT/Mouth:  denies sore throat, tinnitis  Eyes: denies vision changes/pain  CV:  denies chest pain  Respiratory:  denies cough/SOB  GI:  denies N/V, diarrhea, abdominal pain.    :   denies dysuria  Skin:  denies lesions or pruritis   Neuro:  denies weakness, focal neurologic symptoms    Vitals:    22 1303   BP: 117/79   BP Location: Right arm   Patient Position: Sitting   Pulse: 102   Temp: 97.8 °F (36.6 °C)   TempSrc: Oral   Weight: 66 kg (145 lb 6.4 oz)       PHYSICAL EXAM   GENERAL: Not in acute distress, AAOx3, pleasant  CARDIO: regular rate and rhythm  PULM: symmetric chest rise, speaking in complete sentences without difficulty  NEURO: awake, alert and oriented to person, place, and time  ABDOMINAL: No fundal tenderness, no rebound or guarding, gravid  EXTREMITIES: no bilateral lower extremity edema/tenderness  SKIN: Warm, well-perfused      ULTRASOUND   Please view full ultrasound note on Imaging tab in [employer].      ASSESSMENT/COUNSELIN y.o. G  P  at   /7 weeks gestation (Estimated Date of Delivery: 22)    -Pregnancy  [ X ] stable  [   ] improving [  ] worsening    There are no diagnoses linked to this encounter.     Late prenatal care:  Pt brought records from outside u/s. EDC of 22 noted with report based on 8 week u/s. Agree with using this dating. She was able to show me the picture today, as well.      FH CHD:  Brother and his kids have had ASD repairs and possible other CHD (possibly VSD).  Pt's other children no issues.    The recurrence risks for offspring are  about 7% in most instances of VSD.  ASD inheritance is more difficult to pin down, but it it does occur more often in families (presumably risk somewhere between 1-10%).      Thus, a fetal ECHO is indicated.  I discussed with the patient that given her normal cardiac views it would likely be normal but I do recommend an ECHO with this family history for extra reassurance.  She reports her other kids did not have issues and if it will most likely be normal, she would like to defer the ECHO.      LGA:    Mild Polyhydramnios:   Today, her US showed that her fluid was 28. She had a normal glucola about a week ago.  I counseled the patient about her mild polyhydramnios.  We reviewed the finding of mild polyhydramnios and potential causes and outcomes. The implications of polyhydramnios were discussed.? Fetal anomalies and genetic diagnoses become more common with severe polyhydramnios, while maternal diabetes and idiopathic factors are more often associated with milder cases, such as with this patient.  After birth, an abnormality may be diagnosed of cases considered idiopathic and not identified prenatally. Fetal infection, anemia, and neuromuscular disorders account for some of these cases, but are most commonly found with marked polyhydramnios which is not seen today.? Polyhydramnios may also be found in fetuses with chromosome abnormalities such as aneuploidy, however, the likelihood without associated structural anomaly is likely around only 1-2%.  We discussed the low probability of adverse  outcome if the fluid remains only mildly elevated and there is no evidence of maternal diabetes.  The most likely cause of hers is likely idiopathic.      I recommend following her weekly with HARIS and BPP. If the polyhydramnios stays mild, delivery at 39 weeks is appropriate.  If, however, the HARIS increases above 30 cm, I would recommend delivery at 38 weeks and if the fluid increases to a markedly elevated range of  >35 cm in the absence of GDM, I would be more suspicious for an unrecognized fetal malformation.?At >35 cm of fluid, delivery is recommended at 37 weeks.         Summary of Plan  -Serial growth ultrasounds every 3-4 weeks by primary OB  -Weekly BPP until delivery  -CS at 39 weeks (pt has had 2 prior)    Follow-up: No follow up with MFM scheduled, but I am happy to see for follow up at request of primary obstetrician    Thank you for the consult and opportunity to care for this patient.  Please feel free to reach out with any questions or concerns.      I spent 30 minutes caring for this patient on this date of service. This time includes time spent by me in the following activities: preparing for the visit, reviewing tests, obtaining and/or reviewing a separately obtained history, performing a medically appropriate examination and/or evaluation, counseling and educating the patient/family/caregiver and independently interpreting results and communicating that information with the patient/family/caregiver with greater than 50% spent in counseling and coordination of care.     Cassia Delcid MD FACOG  Maternal Fetal Medicine-[facility]  Office: [phone]  [email or web address]

## 2022-11-21 ENCOUNTER — ROUTINE PRENATAL (OUTPATIENT)
Dept: OBSTETRICS AND GYNECOLOGY | Age: 27
End: 2022-11-21

## 2022-11-21 VITALS — WEIGHT: 145.2 LBS | BODY MASS INDEX: 24.92 KG/M2 | SYSTOLIC BLOOD PRESSURE: 112 MMHG | DIASTOLIC BLOOD PRESSURE: 68 MMHG

## 2022-11-21 DIAGNOSIS — Z3A.35 35 WEEKS GESTATION OF PREGNANCY: Primary | ICD-10-CM

## 2022-11-21 LAB
GLUCOSE UR STRIP-MCNC: NEGATIVE MG/DL
PROT UR STRIP-MCNC: NEGATIVE MG/DL

## 2022-11-21 PROCEDURE — 99213 OFFICE O/P EST LOW 20 MIN: CPT | Performed by: OBSTETRICS & GYNECOLOGY

## 2022-11-21 NOTE — PROGRESS NOTES
CC: OB visit  HPI: pt presents for routine visit. She notes active fetal movement. She denies vaginal bleeding/leaking fluid or reg ctx.   ROS:  GI: neg for n/v  : neg for vag bleeding/leaking fluid/reg ctx    O: fh= 38 cm, FHT's 139 bpm, ext: no edema or cords  U/s: BPP = 8/8, arina 18    A/p: late pnc: reassuring BPP; advised daily fmc's and reviewed labor warnings.     LGA/family hx CHD: pt has consult with mfm and u/s booked tomorrow    Hx c/s: pt desires repeat c/s and declines tubal    Anemia: pt declined iron infusion. She reports she is taking oral iron 3 times daily and B 12 without issues.

## 2022-11-22 ENCOUNTER — OFFICE VISIT (OUTPATIENT)
Dept: OBSTETRICS AND GYNECOLOGY | Facility: CLINIC | Age: 27
End: 2022-11-22

## 2022-11-22 ENCOUNTER — HOSPITAL ENCOUNTER (OUTPATIENT)
Dept: ULTRASOUND IMAGING | Facility: HOSPITAL | Age: 27
Discharge: HOME | End: 2022-11-22
Admitting: OBSTETRICS & GYNECOLOGY

## 2022-11-22 VITALS
WEIGHT: 145.4 LBS | TEMPERATURE: 97.8 F | HEART RATE: 102 BPM | SYSTOLIC BLOOD PRESSURE: 117 MMHG | DIASTOLIC BLOOD PRESSURE: 79 MMHG | BODY MASS INDEX: 24.96 KG/M2

## 2022-11-22 DIAGNOSIS — Z82.79 FAMILY HISTORY OF CONGENITAL HEART DEFECT: Primary | ICD-10-CM

## 2022-11-22 DIAGNOSIS — O36.60X0 EXCESSIVE FETAL GROWTH AFFECTING MANAGEMENT OF PREGNANCY, ANTEPARTUM, SINGLE OR UNSPECIFIED FETUS: ICD-10-CM

## 2022-11-22 DIAGNOSIS — O40.9XX0 POLYHYDRAMNIOS AFFECTING PREGNANCY: ICD-10-CM

## 2022-11-22 PROCEDURE — 76811 OB US DETAILED SNGL FETUS: CPT

## 2022-11-22 PROCEDURE — 76819 FETAL BIOPHYS PROFIL W/O NST: CPT

## 2022-11-22 PROCEDURE — 76811 OB US DETAILED SNGL FETUS: CPT | Performed by: OBSTETRICS & GYNECOLOGY

## 2022-11-22 PROCEDURE — 76819 FETAL BIOPHYS PROFIL W/O NST: CPT | Performed by: OBSTETRICS & GYNECOLOGY

## 2022-11-22 PROCEDURE — 99214 OFFICE O/P EST MOD 30 MIN: CPT | Performed by: OBSTETRICS & GYNECOLOGY

## 2022-11-22 NOTE — PROGRESS NOTES
Pt reports that she is doing well and denies vaginal bleeding, cramping, contractions or LOF at this time. Notes abdominal tightness. Reports active fetal movement. Denies HA, visual changes or epigastric pain. Next OB appointment scheduled for 11/30.    Vitals:    11/22/22 1303   BP: 117/79   Pulse: 102   Temp: 97.8 °F (36.6 °C)

## 2022-11-30 ENCOUNTER — ROUTINE PRENATAL (OUTPATIENT)
Dept: OBSTETRICS AND GYNECOLOGY | Age: 27
End: 2022-11-30

## 2022-11-30 VITALS — SYSTOLIC BLOOD PRESSURE: 116 MMHG | WEIGHT: 146.2 LBS | DIASTOLIC BLOOD PRESSURE: 78 MMHG | BODY MASS INDEX: 25.1 KG/M2

## 2022-11-30 DIAGNOSIS — Z13.89 SCREENING FOR BLOOD OR PROTEIN IN URINE: Primary | ICD-10-CM

## 2022-11-30 DIAGNOSIS — O99.019 MATERNAL ANEMIA IN PREGNANCY, ANTEPARTUM: ICD-10-CM

## 2022-11-30 DIAGNOSIS — O09.30 LATE PRENATAL CARE: ICD-10-CM

## 2022-11-30 DIAGNOSIS — O40.3XX0 POLYHYDRAMNIOS IN THIRD TRIMESTER COMPLICATION, SINGLE OR UNSPECIFIED FETUS: ICD-10-CM

## 2022-11-30 DIAGNOSIS — Z98.891 PREVIOUS CESAREAN SECTION: ICD-10-CM

## 2022-11-30 LAB
GLUCOSE UR STRIP-MCNC: NEGATIVE MG/DL
PROT UR STRIP-MCNC: NEGATIVE MG/DL

## 2022-11-30 PROCEDURE — 99213 OFFICE O/P EST LOW 20 MIN: CPT | Performed by: OBSTETRICS & GYNECOLOGY

## 2022-11-30 NOTE — PROGRESS NOTES
CC: OB visit  HPI: pt presents for routine visit. She denies bleeding/reg ctx or leaking fluid.     ROS:  Pulm: neg for soa  : neg for reg ctx, vag bleeding/leaking fluid    O: fh= 39 cm, FHT's 131 to 145 bpm  Ext: no edema or cords    BPP 8/8, arina 25, breech pres    A/p: polyhydramnios/LGA: pt saw MFM and had normal targeted u/s. Pt passed one hour. Advised daily fmc's and continue weekly BPP. MFM advised del at 39 weeks as planned unless arina increases to > 30. Reviewed labor warnings. F/u 1 week or prn    Family hx CHD: normal targeted u/s, pt declines fetal echo.     Late PNC: m agrees with using first trim u/s dating. Continue weekly BPP through delivery    Hx c/s X 2: pt declines tubal. Reviewed labor warnings    Breech pres: counseled pt, advised her to proceed to L&D asap with any signs of labor.     Anemia: pt taking oral iron 3 times daily. She declined iron infusion. Will repeat cbc and ferritin today.

## 2022-12-01 LAB
BASOPHILS # BLD AUTO: 0.03 10*3/MM3 (ref 0–0.2)
BASOPHILS NFR BLD AUTO: 0.3 % (ref 0–1.5)
EOSINOPHIL # BLD AUTO: 0.12 10*3/MM3 (ref 0–0.4)
EOSINOPHIL NFR BLD AUTO: 1.2 % (ref 0.3–6.2)
ERYTHROCYTE [DISTWIDTH] IN BLOOD BY AUTOMATED COUNT: 16.7 % (ref 12.3–15.4)
FERRITIN SERPL-MCNC: 43.2 NG/ML (ref 13–150)
HCT VFR BLD AUTO: 33.7 % (ref 34–46.6)
HGB BLD-MCNC: 11.1 G/DL (ref 12–15.9)
IMM GRANULOCYTES # BLD AUTO: 0.17 10*3/MM3 (ref 0–0.05)
IMM GRANULOCYTES NFR BLD AUTO: 1.7 % (ref 0–0.5)
LYMPHOCYTES # BLD AUTO: 1.77 10*3/MM3 (ref 0.7–3.1)
LYMPHOCYTES NFR BLD AUTO: 17.8 % (ref 19.6–45.3)
MCH RBC QN AUTO: 28 PG (ref 26.6–33)
MCHC RBC AUTO-ENTMCNC: 32.9 G/DL (ref 31.5–35.7)
MCV RBC AUTO: 85.1 FL (ref 79–97)
MONOCYTES # BLD AUTO: 0.75 10*3/MM3 (ref 0.1–0.9)
MONOCYTES NFR BLD AUTO: 7.5 % (ref 5–12)
NEUTROPHILS # BLD AUTO: 7.1 10*3/MM3 (ref 1.7–7)
NEUTROPHILS NFR BLD AUTO: 71.5 % (ref 42.7–76)
NRBC BLD AUTO-RTO: 0 /100 WBC (ref 0–0.2)
PLATELET # BLD AUTO: 182 10*3/MM3 (ref 140–450)
RBC # BLD AUTO: 3.96 10*6/MM3 (ref 3.77–5.28)
WBC # BLD AUTO: 9.94 10*3/MM3 (ref 3.4–10.8)

## 2022-12-05 ENCOUNTER — ROUTINE PRENATAL (OUTPATIENT)
Dept: OBSTETRICS AND GYNECOLOGY | Age: 27
End: 2022-12-05

## 2022-12-05 ENCOUNTER — PREP FOR SURGERY (OUTPATIENT)
Dept: OTHER | Facility: HOSPITAL | Age: 27
End: 2022-12-05

## 2022-12-05 VITALS — SYSTOLIC BLOOD PRESSURE: 112 MMHG | DIASTOLIC BLOOD PRESSURE: 64 MMHG | WEIGHT: 148.2 LBS | BODY MASS INDEX: 25.44 KG/M2

## 2022-12-05 DIAGNOSIS — O40.3XX0 POLYHYDRAMNIOS IN THIRD TRIMESTER COMPLICATION, SINGLE OR UNSPECIFIED FETUS: Primary | ICD-10-CM

## 2022-12-05 DIAGNOSIS — Z3A.37 37 WEEKS GESTATION OF PREGNANCY: Primary | ICD-10-CM

## 2022-12-05 LAB
GLUCOSE UR STRIP-MCNC: NEGATIVE MG/DL
PROT UR STRIP-MCNC: NEGATIVE MG/DL

## 2022-12-05 PROCEDURE — 99213 OFFICE O/P EST LOW 20 MIN: CPT | Performed by: OBSTETRICS & GYNECOLOGY

## 2022-12-05 NOTE — PROGRESS NOTES
CC: ob visit  HPI: pt presents for routine visit. She notes it is becoming difficult to get her breath particularly at night. She is having irregular ctx. She does feel fetal movement and denies bleeding/leaking fluid.    ROS:  Pulm: pos for soa   : neg for vag bleeding/leaking fluid    O: fh= 41 cm, FHT's 141 bpm  BPP 8/8, arina 27.5, growth 99 %, AC > 99 %    A/p: polyhydramnios: pt with difficulty breathing. Discussed option of proceeding with c/s at 38 weeks and pt desires to proceed. Discussed risks of prematurity and pt notes understanding but desires to proceed with delivery given her symptoms. preop instructions reviewed. Pt has declined tubal. Continue daily fmc's and labor warnings through delivery.    Anemia: she is taking iron 3 times daily and hgb/ferritin improved

## 2022-12-06 ENCOUNTER — PREP FOR SURGERY (OUTPATIENT)
Dept: OTHER | Facility: HOSPITAL | Age: 27
End: 2022-12-06

## 2022-12-06 DIAGNOSIS — O40.3XX0 POLYHYDRAMNIOS IN THIRD TRIMESTER COMPLICATION, SINGLE OR UNSPECIFIED FETUS: Primary | ICD-10-CM

## 2022-12-06 RX ORDER — CARBOPROST TROMETHAMINE 250 UG/ML
250 INJECTION, SOLUTION INTRAMUSCULAR
Status: CANCELLED | OUTPATIENT
Start: 2022-12-06

## 2022-12-06 RX ORDER — OXYTOCIN/0.9 % SODIUM CHLORIDE 30/500 ML
250 PLASTIC BAG, INJECTION (ML) INTRAVENOUS CONTINUOUS
Status: CANCELLED | OUTPATIENT
Start: 2022-12-06 | End: 2022-12-06

## 2022-12-06 RX ORDER — ACETAMINOPHEN 500 MG
1000 TABLET ORAL ONCE
Status: CANCELLED | OUTPATIENT
Start: 2022-12-06 | End: 2022-12-06

## 2022-12-06 RX ORDER — METHYLERGONOVINE MALEATE 0.2 MG/ML
200 INJECTION INTRAVENOUS ONCE AS NEEDED
Status: CANCELLED | OUTPATIENT
Start: 2022-12-06

## 2022-12-06 RX ORDER — SODIUM CHLORIDE, SODIUM LACTATE, POTASSIUM CHLORIDE, CALCIUM CHLORIDE 600; 310; 30; 20 MG/100ML; MG/100ML; MG/100ML; MG/100ML
125 INJECTION, SOLUTION INTRAVENOUS CONTINUOUS
Status: CANCELLED | OUTPATIENT
Start: 2022-12-06

## 2022-12-06 RX ORDER — CEFAZOLIN SODIUM 2 G/100ML
2 INJECTION, SOLUTION INTRAVENOUS ONCE
Status: CANCELLED | OUTPATIENT
Start: 2022-12-06 | End: 2022-12-06

## 2022-12-06 RX ORDER — KETOROLAC TROMETHAMINE 30 MG/ML
30 INJECTION, SOLUTION INTRAMUSCULAR; INTRAVENOUS ONCE
Status: CANCELLED | OUTPATIENT
Start: 2022-12-06 | End: 2022-12-06

## 2022-12-06 RX ORDER — MISOPROSTOL 200 UG/1
800 TABLET ORAL ONCE AS NEEDED
Status: CANCELLED | OUTPATIENT
Start: 2022-12-06

## 2022-12-06 RX ORDER — OXYTOCIN/0.9 % SODIUM CHLORIDE 30/500 ML
999 PLASTIC BAG, INJECTION (ML) INTRAVENOUS ONCE
Status: CANCELLED | OUTPATIENT
Start: 2022-12-06 | End: 2022-12-06

## 2022-12-06 RX ORDER — SODIUM CHLORIDE 0.9 % (FLUSH) 0.9 %
10 SYRINGE (ML) INJECTION AS NEEDED
Status: CANCELLED | OUTPATIENT
Start: 2022-12-06

## 2022-12-06 RX ORDER — SODIUM CHLORIDE 0.9 % (FLUSH) 0.9 %
10 SYRINGE (ML) INJECTION EVERY 12 HOURS SCHEDULED
Status: CANCELLED | OUTPATIENT
Start: 2022-12-06

## 2022-12-07 ENCOUNTER — ANESTHESIA EVENT (OUTPATIENT)
Dept: LABOR AND DELIVERY | Facility: HOSPITAL | Age: 27
End: 2022-12-07

## 2022-12-07 ENCOUNTER — HOSPITAL ENCOUNTER (INPATIENT)
Facility: HOSPITAL | Age: 27
LOS: 4 days | Discharge: HOME | End: 2022-12-11
Attending: OBSTETRICS & GYNECOLOGY | Admitting: OBSTETRICS & GYNECOLOGY

## 2022-12-07 ENCOUNTER — ANESTHESIA (OUTPATIENT)
Dept: LABOR AND DELIVERY | Facility: HOSPITAL | Age: 27
End: 2022-12-07

## 2022-12-07 DIAGNOSIS — O40.3XX0 POLYHYDRAMNIOS IN THIRD TRIMESTER COMPLICATION, SINGLE OR UNSPECIFIED FETUS: ICD-10-CM

## 2022-12-07 PROBLEM — O40.9XX0 POLYHYDRAMNIOS AFFECTING PREGNANCY: Status: ACTIVE | Noted: 2022-12-07

## 2022-12-07 PROBLEM — J10.1 INFLUENZA A: Status: ACTIVE | Noted: 2022-12-07

## 2022-12-07 LAB
ABO GROUP BLD: NORMAL
ALBUMIN SERPL-MCNC: 3.5 G/DL (ref 3.5–5.2)
ALBUMIN/GLOB SERPL: 1.5 G/DL
ALP SERPL-CCNC: 171 U/L (ref 39–117)
ALT SERPL W P-5'-P-CCNC: <5 U/L (ref 1–33)
AMPHET+METHAMPHET UR QL: NEGATIVE
ANION GAP SERPL CALCULATED.3IONS-SCNC: 14 MMOL/L (ref 5–15)
AST SERPL-CCNC: 17 U/L (ref 1–32)
ATMOSPHERIC PRESS: 753.3 MMHG
B PARAPERT DNA SPEC QL NAA+PROBE: NOT DETECTED
B PERT DNA SPEC QL NAA+PROBE: NOT DETECTED
BARBITURATES UR QL SCN: NEGATIVE
BASE EXCESS BLDCOV CALC-SCNC: -1.5 MMOL/L (ref -30–30)
BDY SITE: ABNORMAL
BENZODIAZ UR QL SCN: NEGATIVE
BILIRUB SERPL-MCNC: 0.3 MG/DL (ref 0–1.2)
BLD GP AB SCN SERPL QL: NEGATIVE
BUN SERPL-MCNC: 5 MG/DL (ref 6–20)
BUN/CREAT SERPL: 9.8 (ref 7–25)
C PNEUM DNA NPH QL NAA+NON-PROBE: NOT DETECTED
CALCIUM SPEC-SCNC: 8.7 MG/DL (ref 8.6–10.5)
CANNABINOIDS SERPL QL: NEGATIVE
CHLORIDE SERPL-SCNC: 103 MMOL/L (ref 98–107)
CO2 SERPL-SCNC: 21 MMOL/L (ref 22–29)
COCAINE UR QL: NEGATIVE
COLLECT TME SMN: ABNORMAL
CREAT SERPL-MCNC: 0.51 MG/DL (ref 0.57–1)
CREAT UR-MCNC: 117.1 MG/DL
DEPRECATED RDW RBC AUTO: 55.7 FL (ref 37–54)
EGFRCR SERPLBLD CKD-EPI 2021: 133 ML/MIN/1.73
ERYTHROCYTE [DISTWIDTH] IN BLOOD BY AUTOMATED COUNT: 18 % (ref 12.3–15.4)
FLUAV H1 2009 PAND RNA NPH QL NAA+PROBE: DETECTED
FLUBV RNA ISLT QL NAA+PROBE: NOT DETECTED
GAS FLOW AIRWAY: 3 LPM
GLOBULIN UR ELPH-MCNC: 2.4 GM/DL
GLUCOSE SERPL-MCNC: 76 MG/DL (ref 65–99)
HADV DNA SPEC NAA+PROBE: NOT DETECTED
HCO3 BLDCOV-SCNC: 23.4 MMOL/L
HCOV 229E RNA SPEC QL NAA+PROBE: NOT DETECTED
HCOV HKU1 RNA SPEC QL NAA+PROBE: NOT DETECTED
HCOV NL63 RNA SPEC QL NAA+PROBE: NOT DETECTED
HCOV OC43 RNA SPEC QL NAA+PROBE: NOT DETECTED
HCT VFR BLD AUTO: 37.5 % (ref 34–46.6)
HGB BLD-MCNC: 12.2 G/DL (ref 12–15.9)
HMPV RNA NPH QL NAA+NON-PROBE: NOT DETECTED
HPIV1 RNA ISLT QL NAA+PROBE: NOT DETECTED
HPIV2 RNA SPEC QL NAA+PROBE: NOT DETECTED
HPIV3 RNA NPH QL NAA+PROBE: NOT DETECTED
HPIV4 P GENE NPH QL NAA+PROBE: NOT DETECTED
M PNEUMO IGG SER IA-ACNC: NOT DETECTED
MCH RBC QN AUTO: 28.1 PG (ref 26.6–33)
MCHC RBC AUTO-ENTMCNC: 32.5 G/DL (ref 31.5–35.7)
MCV RBC AUTO: 86.4 FL (ref 79–97)
METHADONE UR QL SCN: NEGATIVE
MODALITY: ABNORMAL
NOTE: ABNORMAL
OPIATES UR QL: NEGATIVE
OXYCODONE UR QL SCN: NEGATIVE
PCO2 BLDCOV: 39.2 MM HG (ref 35–51.3)
PH BLDCOV: 7.38 PH UNITS (ref 7.26–7.4)
PLATELET # BLD AUTO: 186 10*3/MM3 (ref 140–450)
PMV BLD AUTO: 11.1 FL (ref 6–12)
PO2 BLDCOV: 32.2 MM HG (ref 19–39)
POTASSIUM SERPL-SCNC: 3.6 MMOL/L (ref 3.5–5.2)
PROT ?TM UR-MCNC: 18.3 MG/DL
PROT SERPL-MCNC: 5.9 G/DL (ref 6–8.5)
PROT/CREAT UR: 156.3 MG/G CREA (ref 0–200)
RBC # BLD AUTO: 4.34 10*6/MM3 (ref 3.77–5.28)
RH BLD: POSITIVE
RHINOVIRUS RNA SPEC NAA+PROBE: NOT DETECTED
RSV RNA NPH QL NAA+NON-PROBE: NOT DETECTED
SAO2 % BLDCOA: 61.2 % (ref 92–99)
SAO2 % BLDCOV: ABNORMAL %
SARS-COV-2 RNA NPH QL NAA+NON-PROBE: NOT DETECTED
SODIUM SERPL-SCNC: 138 MMOL/L (ref 136–145)
T&S EXPIRATION DATE: NORMAL
WBC NRBC COR # BLD: 7.96 10*3/MM3 (ref 3.4–10.8)

## 2022-12-07 PROCEDURE — 25010000002 ONDANSETRON PER 1 MG: Performed by: ANESTHESIOLOGY

## 2022-12-07 PROCEDURE — 25010000002 HYDROMORPHONE PER 4 MG: Performed by: ANESTHESIOLOGY

## 2022-12-07 PROCEDURE — 80307 DRUG TEST PRSMV CHEM ANLYZR: CPT | Performed by: OBSTETRICS & GYNECOLOGY

## 2022-12-07 PROCEDURE — 59515 CESAREAN DELIVERY: CPT | Performed by: OBSTETRICS & GYNECOLOGY

## 2022-12-07 PROCEDURE — 80053 COMPREHEN METABOLIC PANEL: CPT | Performed by: OBSTETRICS & GYNECOLOGY

## 2022-12-07 PROCEDURE — 25010000002 KETOROLAC TROMETHAMINE PER 15 MG: Performed by: ANESTHESIOLOGY

## 2022-12-07 PROCEDURE — 84156 ASSAY OF PROTEIN URINE: CPT | Performed by: OBSTETRICS & GYNECOLOGY

## 2022-12-07 PROCEDURE — 25010000002 CEFAZOLIN IN DEXTROSE 2-4 GM/100ML-% SOLUTION: Performed by: OBSTETRICS & GYNECOLOGY

## 2022-12-07 PROCEDURE — 82570 ASSAY OF URINE CREATININE: CPT | Performed by: OBSTETRICS & GYNECOLOGY

## 2022-12-07 PROCEDURE — S0260 H&P FOR SURGERY: HCPCS | Performed by: OBSTETRICS & GYNECOLOGY

## 2022-12-07 PROCEDURE — 86850 RBC ANTIBODY SCREEN: CPT | Performed by: OBSTETRICS & GYNECOLOGY

## 2022-12-07 PROCEDURE — 25010000002 OXYTOCIN PER 10 UNITS: Performed by: OBSTETRICS & GYNECOLOGY

## 2022-12-07 PROCEDURE — 25010000002 KETOROLAC TROMETHAMINE PER 15 MG: Performed by: OBSTETRICS & GYNECOLOGY

## 2022-12-07 PROCEDURE — 25010000002 MORPHINE PER 10 MG: Performed by: ANESTHESIOLOGY

## 2022-12-07 PROCEDURE — 86901 BLOOD TYPING SEROLOGIC RH(D): CPT | Performed by: OBSTETRICS & GYNECOLOGY

## 2022-12-07 PROCEDURE — 85027 COMPLETE CBC AUTOMATED: CPT | Performed by: OBSTETRICS & GYNECOLOGY

## 2022-12-07 PROCEDURE — 0202U NFCT DS 22 TRGT SARS-COV-2: CPT | Performed by: OBSTETRICS & GYNECOLOGY

## 2022-12-07 PROCEDURE — 86900 BLOOD TYPING SEROLOGIC ABO: CPT | Performed by: OBSTETRICS & GYNECOLOGY

## 2022-12-07 PROCEDURE — 82803 BLOOD GASES ANY COMBINATION: CPT

## 2022-12-07 RX ORDER — SODIUM CHLORIDE, SODIUM LACTATE, POTASSIUM CHLORIDE, CALCIUM CHLORIDE 600; 310; 30; 20 MG/100ML; MG/100ML; MG/100ML; MG/100ML
125 INJECTION, SOLUTION INTRAVENOUS CONTINUOUS
Status: DISCONTINUED | OUTPATIENT
Start: 2022-12-07 | End: 2022-12-07

## 2022-12-07 RX ORDER — MORPHINE SULFATE 1 MG/ML
INJECTION, SOLUTION EPIDURAL; INTRATHECAL; INTRAVENOUS
Status: COMPLETED | OUTPATIENT
Start: 2022-12-07 | End: 2022-12-07

## 2022-12-07 RX ORDER — PHENYLEPHRINE HCL IN 0.9% NACL 1 MG/10 ML
SYRINGE (ML) INTRAVENOUS AS NEEDED
Status: DISCONTINUED | OUTPATIENT
Start: 2022-12-07 | End: 2022-12-07 | Stop reason: SURG

## 2022-12-07 RX ORDER — ACETAMINOPHEN 500 MG
1000 TABLET ORAL EVERY 6 HOURS
Status: COMPLETED | OUTPATIENT
Start: 2022-12-07 | End: 2022-12-08

## 2022-12-07 RX ORDER — OXYCODONE HYDROCHLORIDE 10 MG/1
10 TABLET ORAL EVERY 4 HOURS PRN
Status: DISCONTINUED | OUTPATIENT
Start: 2022-12-07 | End: 2022-12-11 | Stop reason: HOSPADM

## 2022-12-07 RX ORDER — ONDANSETRON 2 MG/ML
4 INJECTION INTRAMUSCULAR; INTRAVENOUS ONCE AS NEEDED
Status: COMPLETED | OUTPATIENT
Start: 2022-12-07 | End: 2022-12-07

## 2022-12-07 RX ORDER — METHYLERGONOVINE MALEATE 0.2 MG/ML
200 INJECTION INTRAVENOUS ONCE AS NEEDED
Status: DISCONTINUED | OUTPATIENT
Start: 2022-12-07 | End: 2022-12-11 | Stop reason: HOSPADM

## 2022-12-07 RX ORDER — OXYCODONE HYDROCHLORIDE 5 MG/1
5 TABLET ORAL EVERY 4 HOURS PRN
Status: DISCONTINUED | OUTPATIENT
Start: 2022-12-07 | End: 2022-12-11 | Stop reason: HOSPADM

## 2022-12-07 RX ORDER — FAMOTIDINE 10 MG/ML
20 INJECTION, SOLUTION INTRAVENOUS ONCE AS NEEDED
Status: COMPLETED | OUTPATIENT
Start: 2022-12-07 | End: 2022-12-07

## 2022-12-07 RX ORDER — CEFAZOLIN SODIUM 2 G/100ML
2 INJECTION, SOLUTION INTRAVENOUS ONCE
Status: COMPLETED | OUTPATIENT
Start: 2022-12-07 | End: 2022-12-07

## 2022-12-07 RX ORDER — MISOPROSTOL 200 UG/1
600 TABLET ORAL ONCE AS NEEDED
Status: DISCONTINUED | OUTPATIENT
Start: 2022-12-07 | End: 2022-12-11 | Stop reason: HOSPADM

## 2022-12-07 RX ORDER — IBUPROFEN 600 MG/1
600 TABLET ORAL EVERY 6 HOURS
Status: DISCONTINUED | OUTPATIENT
Start: 2022-12-08 | End: 2022-12-11 | Stop reason: HOSPADM

## 2022-12-07 RX ORDER — ONDANSETRON 2 MG/ML
4 INJECTION INTRAMUSCULAR; INTRAVENOUS ONCE AS NEEDED
Status: DISCONTINUED | OUTPATIENT
Start: 2022-12-07 | End: 2022-12-11 | Stop reason: HOSPADM

## 2022-12-07 RX ORDER — NALOXONE HCL 0.4 MG/ML
0.2 VIAL (ML) INJECTION
Status: DISCONTINUED | OUTPATIENT
Start: 2022-12-07 | End: 2022-12-11 | Stop reason: HOSPADM

## 2022-12-07 RX ORDER — PHYTONADIONE 1 MG/.5ML
INJECTION, EMULSION INTRAMUSCULAR; INTRAVENOUS; SUBCUTANEOUS
Status: DISPENSED
Start: 2022-12-07 | End: 2022-12-07

## 2022-12-07 RX ORDER — CARBOPROST TROMETHAMINE 250 UG/ML
250 INJECTION, SOLUTION INTRAMUSCULAR
Status: DISCONTINUED | OUTPATIENT
Start: 2022-12-07 | End: 2022-12-11 | Stop reason: HOSPADM

## 2022-12-07 RX ORDER — TRISODIUM CITRATE DIHYDRATE AND CITRIC ACID MONOHYDRATE 500; 334 MG/5ML; MG/5ML
30 SOLUTION ORAL ONCE
Status: COMPLETED | OUTPATIENT
Start: 2022-12-07 | End: 2022-12-07

## 2022-12-07 RX ORDER — OXYTOCIN/0.9 % SODIUM CHLORIDE 30/500 ML
250 PLASTIC BAG, INJECTION (ML) INTRAVENOUS CONTINUOUS
Status: ACTIVE | OUTPATIENT
Start: 2022-12-07 | End: 2022-12-07

## 2022-12-07 RX ORDER — PRENATAL VIT/IRON FUM/FOLIC AC 27MG-0.8MG
1 TABLET ORAL DAILY
Status: DISCONTINUED | OUTPATIENT
Start: 2022-12-07 | End: 2022-12-11 | Stop reason: HOSPADM

## 2022-12-07 RX ORDER — DIPHENHYDRAMINE HYDROCHLORIDE 50 MG/ML
25 INJECTION INTRAMUSCULAR; INTRAVENOUS EVERY 4 HOURS PRN
Status: DISCONTINUED | OUTPATIENT
Start: 2022-12-07 | End: 2022-12-11 | Stop reason: HOSPADM

## 2022-12-07 RX ORDER — HYDROMORPHONE HYDROCHLORIDE 1 MG/ML
0.5 INJECTION, SOLUTION INTRAMUSCULAR; INTRAVENOUS; SUBCUTANEOUS
Status: DISCONTINUED | OUTPATIENT
Start: 2022-12-07 | End: 2022-12-07 | Stop reason: HOSPADM

## 2022-12-07 RX ORDER — KETOROLAC TROMETHAMINE 15 MG/ML
15 INJECTION, SOLUTION INTRAMUSCULAR; INTRAVENOUS EVERY 6 HOURS
Status: DISPENSED | OUTPATIENT
Start: 2022-12-07 | End: 2022-12-08

## 2022-12-07 RX ORDER — MISOPROSTOL 200 UG/1
800 TABLET ORAL ONCE AS NEEDED
Status: DISCONTINUED | OUTPATIENT
Start: 2022-12-07 | End: 2022-12-11 | Stop reason: HOSPADM

## 2022-12-07 RX ORDER — MORPHINE SULFATE 2 MG/ML
2 INJECTION, SOLUTION INTRAMUSCULAR; INTRAVENOUS
Status: ACTIVE | OUTPATIENT
Start: 2022-12-07 | End: 2022-12-08

## 2022-12-07 RX ORDER — ERYTHROMYCIN 5 MG/G
OINTMENT OPHTHALMIC
Status: DISPENSED
Start: 2022-12-07 | End: 2022-12-07

## 2022-12-07 RX ORDER — DIPHENHYDRAMINE HCL 25 MG
25 CAPSULE ORAL EVERY 4 HOURS PRN
Status: DISCONTINUED | OUTPATIENT
Start: 2022-12-07 | End: 2022-12-11 | Stop reason: HOSPADM

## 2022-12-07 RX ORDER — KETOROLAC TROMETHAMINE 30 MG/ML
30 INJECTION, SOLUTION INTRAMUSCULAR; INTRAVENOUS ONCE
Status: DISCONTINUED | OUTPATIENT
Start: 2022-12-07 | End: 2022-12-11 | Stop reason: HOSPADM

## 2022-12-07 RX ORDER — ACETAMINOPHEN 500 MG
1000 TABLET ORAL ONCE
Status: COMPLETED | OUTPATIENT
Start: 2022-12-07 | End: 2022-12-07

## 2022-12-07 RX ORDER — KETOROLAC TROMETHAMINE 30 MG/ML
INJECTION, SOLUTION INTRAMUSCULAR; INTRAVENOUS AS NEEDED
Status: DISCONTINUED | OUTPATIENT
Start: 2022-12-07 | End: 2022-12-07 | Stop reason: SURG

## 2022-12-07 RX ORDER — BUPIVACAINE HYDROCHLORIDE 7.5 MG/ML
INJECTION, SOLUTION EPIDURAL; RETROBULBAR
Status: COMPLETED | OUTPATIENT
Start: 2022-12-07 | End: 2022-12-07

## 2022-12-07 RX ORDER — OXYTOCIN/0.9 % SODIUM CHLORIDE 30/500 ML
999 PLASTIC BAG, INJECTION (ML) INTRAVENOUS ONCE
Status: COMPLETED | OUTPATIENT
Start: 2022-12-07 | End: 2022-12-07

## 2022-12-07 RX ORDER — ACETAMINOPHEN 325 MG/1
650 TABLET ORAL EVERY 6 HOURS
Status: DISCONTINUED | OUTPATIENT
Start: 2022-12-08 | End: 2022-12-11 | Stop reason: HOSPADM

## 2022-12-07 RX ORDER — OXYTOCIN/0.9 % SODIUM CHLORIDE 30/500 ML
125 PLASTIC BAG, INJECTION (ML) INTRAVENOUS CONTINUOUS PRN
Status: COMPLETED | OUTPATIENT
Start: 2022-12-07 | End: 2022-12-07

## 2022-12-07 RX ORDER — SODIUM CHLORIDE 0.9 % (FLUSH) 0.9 %
10 SYRINGE (ML) INJECTION AS NEEDED
Status: DISCONTINUED | OUTPATIENT
Start: 2022-12-07 | End: 2022-12-07 | Stop reason: HOSPADM

## 2022-12-07 RX ORDER — SODIUM CHLORIDE 0.9 % (FLUSH) 0.9 %
10 SYRINGE (ML) INJECTION EVERY 12 HOURS SCHEDULED
Status: DISCONTINUED | OUTPATIENT
Start: 2022-12-07 | End: 2022-12-07 | Stop reason: HOSPADM

## 2022-12-07 RX ADMIN — CEFAZOLIN SODIUM 2 G: 2 INJECTION, SOLUTION INTRAVENOUS at 10:46

## 2022-12-07 RX ADMIN — ONDANSETRON 4 MG: 2 INJECTION INTRAMUSCULAR; INTRAVENOUS at 10:33

## 2022-12-07 RX ADMIN — SODIUM CHLORIDE, POTASSIUM CHLORIDE, SODIUM LACTATE AND CALCIUM CHLORIDE 125 ML/HR: 600; 310; 30; 20 INJECTION, SOLUTION INTRAVENOUS at 09:51

## 2022-12-07 RX ADMIN — FAMOTIDINE 20 MG: 10 INJECTION INTRAVENOUS at 10:33

## 2022-12-07 RX ADMIN — KETOROLAC TROMETHAMINE 15 MG: 15 INJECTION, SOLUTION INTRAMUSCULAR; INTRAVENOUS at 20:20

## 2022-12-07 RX ADMIN — Medication 150 MCG: at 11:20

## 2022-12-07 RX ADMIN — ACETAMINOPHEN 1000 MG: 500 TABLET ORAL at 10:33

## 2022-12-07 RX ADMIN — ACETAMINOPHEN 1000 MG: 500 TABLET ORAL at 17:32

## 2022-12-07 RX ADMIN — SODIUM CHLORIDE, POTASSIUM CHLORIDE, SODIUM LACTATE AND CALCIUM CHLORIDE 1000 ML: 600; 310; 30; 20 INJECTION, SOLUTION INTRAVENOUS at 08:54

## 2022-12-07 RX ADMIN — OXYTOCIN 125 ML/HR: 10 INJECTION, SOLUTION INTRAMUSCULAR; INTRAVENOUS at 13:12

## 2022-12-07 RX ADMIN — Medication 100 MCG: at 11:30

## 2022-12-07 RX ADMIN — Medication 999 ML/HR: at 11:37

## 2022-12-07 RX ADMIN — KETOROLAC TROMETHAMINE 30 MG: 30 INJECTION, SOLUTION INTRAMUSCULAR; INTRAVENOUS at 11:41

## 2022-12-07 RX ADMIN — Medication 100 MCG: at 11:13

## 2022-12-07 RX ADMIN — MORPHINE SULFATE 300 MCG: 1 INJECTION, SOLUTION EPIDURAL; INTRATHECAL; INTRAVENOUS at 11:11

## 2022-12-07 RX ADMIN — BUPIVACAINE HYDROCHLORIDE 1.6 ML: 7.5 INJECTION, SOLUTION EPIDURAL; RETROBULBAR at 11:11

## 2022-12-07 RX ADMIN — ACETAMINOPHEN 1000 MG: 500 TABLET ORAL at 23:29

## 2022-12-07 RX ADMIN — Medication 100 MCG: at 12:01

## 2022-12-07 RX ADMIN — SODIUM CITRATE AND CITRIC ACID MONOHYDRATE 30 ML: 500; 334 SOLUTION ORAL at 10:32

## 2022-12-07 RX ADMIN — HYDROMORPHONE HYDROCHLORIDE 0.5 MG: 1 INJECTION, SOLUTION INTRAMUSCULAR; INTRAVENOUS; SUBCUTANEOUS at 14:31

## 2022-12-07 NOTE — OP NOTE
. SECTION FULL OPERATIVE REPORT  Pre-operative Diagnosis: 38 week Intrauterine pregnancy, Polyhydramnios, Previous  delivery X 2  Post-operative Diagnosis: same  Procedure: Repeat Low Transverse  Section  Anesthesia: spinal  Surgeon(s): Annette Lantigua MD  Assistant(s): Bryce Blanco MD  Infant: LV female infant, Apgars 8/9, weight 8lbs, 5.3oz  Findings: Normal uterus, tubes and ovaries  Complications: none  Specimens: placenta  EBL: 484 mL      Description of Procedure:  The patient was taken to the operating room where anesthesia was found to be adequate. She was prepped and draped in the usual sterile fashion in the dorsal supine position with a leftward tilt. A surgical time-out was performed.   A Pfannenstiel skin incision was made with the scalpel and carried down to the underlying layer of fascia. The fascia was then incised in the midline and the incision was extended laterally with beasley scissors. The superior aspect of the fascia was then grasped with Kocher clamps and the underlying rectus muscles were then dissected off bluntly and with the Beasley scissors. The inferior aspect of the fascia was then grasped with Kocher clamps and dissected off similarly with Beasley scissors. The rectus muscles were  and the peritoneum entered. The peritoneal incision was then carried superiorly and inferiorly taking care to identify the bladder at the lower aspect.   The bladder blade was inserted. The vesicouterine peritoneum was then identified and entered sharply with Metzenbaum scissors and a bladder flap was created.The bladder blade was reinserted and the uterine incision was made in a low transverse fashion using the scalpel. This was extended bilaterally with bandage scissors.  The bladder blade was removed and the vertex was grasped and delivered atraumatically. The remainder of the infant was delivered without issue. The nose and mouth were suctioned and the cord was clamped and cut  after 30 seconds. The infant was taken to the warmer for the waiting staff. Cord blood was collected. The placenta was removed with gentle manual traction. The uterus was exteriorized and cleared of all clots and debris. The uterine incision was repaired in two layers using 0 vicryl suture. A small area of bleeding at the mid incision was made hemostatic with a figure of 8 stitch of 0 vicryl. The uterus was examined and noted to be hemostatic and returned to the abdomen.  The posterior cul-de-sac and gutters were cleared of all clots and debris. The uterus was then re-inspected to ensure hemostasis as were all subfascial tissues. The fascia was re-approximated in a running fashion using 0-vicryl suture. The subcutaneous tissue was re-approximated in a running fashion with 3-0 vicryl. The skin was closed with 4-0 monocryl.     The patient tolerated the procedure well. All sponge, lap and needle counts were correct. She went to the recovery room in stable fashion.    The patient received Kefzol for antibiotic prophylaxis.     The surgical assistant was responsible for the following tasks:   Retraction, suturing, suctioning and assistance with delivery of the infant.  Their skilled assistance was necessary for the success of this case.          Annette Lantigua MD  December 7, 2022

## 2022-12-07 NOTE — ANESTHESIA PREPROCEDURE EVALUATION
Anesthesia Evaluation     NPO Solid Status: > 8 hours  NPO Liquid Status: > 8 hours           Airway   Mallampati: II  TM distance: >3 FB  No difficulty expected  Dental      Pulmonary      ROS comment: Diagnosed with flu today  Cardiovascular         Neuro/Psych  (+) psychiatric history,    GI/Hepatic/Renal/Endo      Musculoskeletal     Abdominal    Substance History      OB/GYN    (+) Pregnant,         Other        ROS/Med Hx Other: Prior C/S under spinal                 Anesthesia Plan    ASA 2     spinal       Anesthetic plan, risks, benefits, and alternatives have been provided, discussed and informed consent has been obtained with: patient.        CODE STATUS:

## 2022-12-07 NOTE — ANESTHESIA PROCEDURE NOTES
Spinal Block      Patient reassessed immediately prior to procedure    Patient location during procedure: OR  Performed By  Anesthesiologist: Jalil Fairbanks MD  Preanesthetic Checklist  Completed: patient identified, IV checked, site marked, risks and benefits discussed, surgical consent, monitors and equipment checked, pre-op evaluation and timeout performed  Spinal Block Prep:  Patient Position:sitting  Sterile Tech:cap, gloves and sterile barriers  Patient Monitoring:EKG, continuous pulse oximetry and blood pressure monitoring    Spinal Block Procedure  Approach:midline  Location:L4-L5  Needle Type:Sprotte  Needle Gauge:24 G  Placement of Spinal needle event:cerebrospinal fluid aspirated  Paresthesia: no  Fluid Appearance:clear  Medications: bupivacaine PF (MARCAINE) 0.75 % injection - Spinal   1.6 mL - 12/7/2022 11:11:00 AM  Morphine PF injection - Intrathecal   300 mcg - 12/7/2022 11:11:00 AM   Post Assessment  Patient Tolerance:patient tolerated the procedure well with no apparent complications  Complications no

## 2022-12-07 NOTE — ANESTHESIA POSTPROCEDURE EVALUATION
Patient: Mary Chandler    Procedure Summary     Date: 22 Room / Location:  [facility]    Anesthesia Start: 1058 Anesthesia Stop: 1238    Procedure:  SECTION REPEAT (Abdomen) Diagnosis:       Previous  section      (Previous  section [Z98.891])    Surgeons: Anntete Lantigua MD Provider: Jalil Fairbanks MD    Anesthesia Type: spinal ASA Status: 2          Anesthesia Type: spinal    Vitals  Vitals Value Taken Time   /73 22 1540   Temp 37 °C (98.6 °F) 22 1540   Pulse 100 22 1540   Resp 16 22 1540   SpO2 95 % 22 1540           Post Anesthesia Care and Evaluation    Patient location during evaluation: PACU  Patient participation: complete - patient participated  Level of consciousness: awake  Pain scale: See nurse's notes for pain score.  Pain management: adequate    Airway patency: patent  Anesthetic complications: No anesthetic complications  PONV Status: none  Cardiovascular status: acceptable  Respiratory status: acceptable  Hydration status: acceptable    Comments: Patient seen and examined postoperatively; vital signs stable; SpO2 greater than or equal to 90%; cardiopulmonary status stable; nausea/vomiting adequately controlled; pain adequately controlled; no apparent anesthesia complications; patient discharged from anesthesia care when discharge criteria were met

## 2022-12-07 NOTE — PLAN OF CARE
Problem: Adult Inpatient Plan of Care  Goal: Plan of Care Review  Outcome: Ongoing, Not Progressing  Flowsheets (Taken 12/7/2022 1306)  Progress: improving  Plan of Care Reviewed With:   patient   significant other  Outcome Evaluation: Pt delivered a baby girl via repeat c/s. Patient tested positive for Flu A. Mom and baby are rooming together but are seperated at this time. Plan is to transfer to postpartum after recovery period if pain, bleeding, and vitals remain WNL.  Goal: Patient-Specific Goal (Individualized)  Outcome: Ongoing, Not Progressing  Flowsheets (Taken 12/7/2022 1306)  Patient-Specific Goals (Include Timeframe): Pain below a 5/10 for duration of 2 hour recovery period  Individualized Care Needs: Keep informed with POC, answer questions, educate  Anxieties, Fears or Concerns: Posititve for flu, seperation from baby, general anxiety  Goal: Absence of Hospital-Acquired Illness or Injury  Outcome: Ongoing, Not Progressing  Intervention: Identify and Manage Fall Risk  Recent Flowsheet Documentation  Taken 12/7/2022 0920 by Marilynn Driver RN  Safety Promotion/Fall Prevention:   assistive device/personal items within reach   clutter free environment maintained   fall prevention program maintained   lighting adjusted   nonskid shoes/slippers when out of bed   room organization consistent   safety round/check completed  Intervention: Prevent and Manage VTE (Venous Thromboembolism) Risk  Recent Flowsheet Documentation  Taken 12/7/2022 1300 by Marilynn Driver, RN  Activity Management: bedrest  VTE Prevention/Management:   bilateral   sequential compression devices on  Taken 12/7/2022 1245 by Marilynn Driver RN  Activity Management: bedrest  VTE Prevention/Management:   bilateral   sequential compression devices on  Taken 12/7/2022 0920 by Marilynn Driver, RN  Activity Management:   activity adjusted per tolerance   up ad lyle  Goal: Optimal Comfort and Wellbeing  Outcome: Ongoing, Not  Progressing  Intervention: Provide Person-Centered Care  Recent Flowsheet Documentation  Taken 2022 0920 by Marilynn Driver RN  Trust Relationship/Rapport:   care explained   choices provided   emotional support provided   empathic listening provided   questions answered   questions encouraged   reassurance provided   thoughts/feelings acknowledged  Goal: Readiness for Transition of Care  Outcome: Ongoing, Not Progressing  Intervention: Mutually Develop Transition Plan  Recent Flowsheet Documentation  Taken 2022 0913 by Marilynn Driver RN  Equipment Currently Used at Home: none  Taken 2022 0911 by Marilynn Driver RN  Transportation Anticipated: family or friend will provide  Patient/Family Anticipated Services at Transition: none  Patient/Family Anticipates Transition to:   home   home with family     Problem: Adjustment to Role Transition (Postpartum  Delivery)  Goal: Successful Maternal Role Transition  Outcome: Ongoing, Not Progressing     Problem: Bleeding (Postpartum  Delivery)  Goal: Hemostasis  Outcome: Ongoing, Not Progressing     Problem: Infection (Postpartum  Delivery)  Goal: Absence of Infection Signs and Symptoms  Outcome: Ongoing, Not Progressing     Problem: Pain (Postpartum  Delivery)  Goal: Acceptable Pain Control  Outcome: Ongoing, Not Progressing     Problem: Postoperative Nausea and Vomiting (Postpartum  Delivery)  Goal: Nausea and Vomiting Relief  Outcome: Ongoing, Not Progressing     Problem: Postoperative Urinary Retention (Postpartum  Delivery)  Goal: Effective Urinary Elimination  Outcome: Ongoing, Not Progressing     Problem: Device-Related Complication Risk (Anesthesia/Analgesia, Neuraxial)  Goal: Safe Infusion Delivery Completion  Outcome: Ongoing, Not Progressing     Problem: Infection (Anesthesia/Analgesia, Neuraxial)  Goal: Absence of Infection Signs and Symptoms  Outcome: Ongoing, Not Progressing      Problem: Nausea and Vomiting (Anesthesia/Analgesia, Neuraxial)  Goal: Nausea and Vomiting Relief  Outcome: Ongoing, Not Progressing     Problem: Pain (Anesthesia/Analgesia, Neuraxial)  Goal: Effective Pain Control  Outcome: Ongoing, Not Progressing     Problem: Respiratory Compromise (Anesthesia/Analgesia, Neuraxial)  Goal: Effective Oxygenation and Ventilation  Outcome: Ongoing, Not Progressing     Problem: Sensorimotor Impairment (Anesthesia/Analgesia, Neuraxial)  Goal: Baseline Motor Function  Outcome: Ongoing, Not Progressing  Intervention: Optimize Sensorimotor Function  Recent Flowsheet Documentation  Taken 2022 by Marilynn Driver RN  Safety Promotion/Fall Prevention:   assistive device/personal items within reach   clutter free environment maintained   fall prevention program maintained   lighting adjusted   nonskid shoes/slippers when out of bed   room organization consistent   safety round/check completed     Problem: Urinary Retention (Anesthesia/Analgesia, Neuraxial)  Goal: Effective Urinary Elimination  Outcome: Ongoing, Not Progressing     Problem: Skin Injury Risk Increased  Goal: Skin Health and Integrity  Outcome: Ongoing, Not Progressing     Problem:  Fall Injury Risk  Goal: Absence of Fall, Infant Drop and Related Injury  Outcome: Ongoing, Not Progressing  Intervention: Identify and Manage Contributors  Recent Flowsheet Documentation  Taken 2022 by Marilynn Driver RN  Medication Review/Management:   medications reviewed   high-risk medications identified  Intervention: Promote Injury-Free Environment  Recent Flowsheet Documentation  Taken 2022 by Marilynn Driver RN  Safety Promotion/Fall Prevention:   assistive device/personal items within reach   clutter free environment maintained   fall prevention program maintained   lighting adjusted   nonskid shoes/slippers when out of bed   room organization consistent   safety round/check  completed   Goal Outcome Evaluation:  Plan of Care Reviewed With: patient, significant other        Progress: improving  Outcome Evaluation: Pt delivered a baby girl via repeat c/s. Patient tested positive for Flu A. Mom and baby are rooming together but are seperated at this time. Plan is to transfer to postpartum after recovery period if pain, bleeding, and vitals remain WNL.

## 2022-12-08 LAB
BASOPHILS # BLD AUTO: 0.03 10*3/MM3 (ref 0–0.2)
BASOPHILS NFR BLD AUTO: 0.3 % (ref 0–1.5)
DEPRECATED RDW RBC AUTO: 56.7 FL (ref 37–54)
EOSINOPHIL # BLD AUTO: 0.03 10*3/MM3 (ref 0–0.4)
EOSINOPHIL NFR BLD AUTO: 0.3 % (ref 0.3–6.2)
ERYTHROCYTE [DISTWIDTH] IN BLOOD BY AUTOMATED COUNT: 17.9 % (ref 12.3–15.4)
HCT VFR BLD AUTO: 38.4 % (ref 34–46.6)
HGB BLD-MCNC: 12.7 G/DL (ref 12–15.9)
IMM GRANULOCYTES # BLD AUTO: 0.12 10*3/MM3 (ref 0–0.05)
IMM GRANULOCYTES NFR BLD AUTO: 1.3 % (ref 0–0.5)
LYMPHOCYTES # BLD AUTO: 1.01 10*3/MM3 (ref 0.7–3.1)
LYMPHOCYTES NFR BLD AUTO: 11 % (ref 19.6–45.3)
MCH RBC QN AUTO: 29.1 PG (ref 26.6–33)
MCHC RBC AUTO-ENTMCNC: 33.1 G/DL (ref 31.5–35.7)
MCV RBC AUTO: 87.9 FL (ref 79–97)
MONOCYTES # BLD AUTO: 0.89 10*3/MM3 (ref 0.1–0.9)
MONOCYTES NFR BLD AUTO: 9.7 % (ref 5–12)
NEUTROPHILS NFR BLD AUTO: 7.07 10*3/MM3 (ref 1.7–7)
NEUTROPHILS NFR BLD AUTO: 77.4 % (ref 42.7–76)
NRBC BLD AUTO-RTO: 0 /100 WBC (ref 0–0.2)
PLATELET # BLD AUTO: 184 10*3/MM3 (ref 140–450)
PMV BLD AUTO: 10.9 FL (ref 6–12)
RBC # BLD AUTO: 4.37 10*6/MM3 (ref 3.77–5.28)
WBC NRBC COR # BLD: 9.15 10*3/MM3 (ref 3.4–10.8)

## 2022-12-08 PROCEDURE — 0503F POSTPARTUM CARE VISIT: CPT | Performed by: OBSTETRICS & GYNECOLOGY

## 2022-12-08 PROCEDURE — 85025 COMPLETE CBC W/AUTO DIFF WBC: CPT | Performed by: OBSTETRICS & GYNECOLOGY

## 2022-12-08 PROCEDURE — 25010000002 KETOROLAC TROMETHAMINE PER 15 MG: Performed by: OBSTETRICS & GYNECOLOGY

## 2022-12-08 RX ADMIN — ACETAMINOPHEN 1000 MG: 500 TABLET ORAL at 04:31

## 2022-12-08 RX ADMIN — KETOROLAC TROMETHAMINE 15 MG: 15 INJECTION, SOLUTION INTRAMUSCULAR; INTRAVENOUS at 07:49

## 2022-12-08 RX ADMIN — ACETAMINOPHEN 650 MG: 325 TABLET, FILM COATED ORAL at 17:18

## 2022-12-08 RX ADMIN — KETOROLAC TROMETHAMINE 15 MG: 15 INJECTION, SOLUTION INTRAMUSCULAR; INTRAVENOUS at 13:26

## 2022-12-08 RX ADMIN — ACETAMINOPHEN 1000 MG: 500 TABLET ORAL at 10:38

## 2022-12-08 RX ADMIN — IBUPROFEN 600 MG: 600 TABLET, FILM COATED ORAL at 21:24

## 2022-12-08 NOTE — PROGRESS NOTES
" POSTPARTUM ROUNDS    Chief complaint: post C/S concerns  SUBJECTIVE:  Patient appears comfortable, and pain seems to be controlled with by mouth medicines.  She is ambulating. .  Discussed advancing activity and diet.       ROS:  Pulm: neg for soa  GI: neg for heavy bleeding  Musculoskel: neg for leg pain      OBJECTIVE:  Vital Signs: /69 (BP Location: Right arm, Patient Position: Sitting)   Pulse 68   Temp 98 °F (36.7 °C) (Oral)   Resp 16   Ht 162.6 cm (64\")   Wt 67 kg (147 lb 12.8 oz)   LMP  (LMP Unknown)   SpO2 98%   Breastfeeding No   BMI 25.37 kg/m²     Intake/Output Summary (Last 24 hours) at 2022 0943  Last data filed at 2022 0600  Gross per 24 hour   Intake 2052 ml   Output 2684 ml   Net -632 ml       Constitutional:  The patient is well nourished.  Cardiovascular:  RRR  Resp:  nonlabored breathing  The incision is normal  Gastrointestinal:  fundus firm below umbilicus  Extremities:  no edema,no evidence dvt    LABS / IMAGING:  Lab Results (last 24 hours)     Procedure Component Value Units Date/Time    CBC & Differential [[id number]]  (Abnormal) Collected: 22    Specimen: Blood Updated: 22    Narrative:      The following orders were created for panel order CBC & Differential.  Procedure                               Abnormality         Status                     ---------                               -----------         ------                     CBC Auto Differential[[id number]]        Abnormal            Final result                 Please view results for these tests on the individual orders.    CBC Auto Differential [[id number]]  (Abnormal) Collected: 22    Specimen: Blood Updated: 22     WBC 9.15 10*3/mm3      RBC 4.37 10*6/mm3      Hemoglobin 12.7 g/dL      Hematocrit 38.4 %      MCV 87.9 fL      MCH 29.1 pg      MCHC 33.1 g/dL      RDW 17.9 %      RDW-SD 56.7 fl      MPV 10.9 fL      Platelets 184 10*3/mm3      Neutrophil % " 77.4 %      Lymphocyte % 11.0 %      Monocyte % 9.7 %      Eosinophil % 0.3 %      Basophil % 0.3 %      Immature Grans % 1.3 %      Neutrophils, Absolute 7.07 10*3/mm3      Lymphocytes, Absolute 1.01 10*3/mm3      Monocytes, Absolute 0.89 10*3/mm3      Eosinophils, Absolute 0.03 10*3/mm3      Basophils, Absolute 0.03 10*3/mm3      Immature Grans, Absolute 0.12 10*3/mm3      nRBC 0.0 /100 WBC     Blood Gas, Venous, Cord [[id number]]  (Abnormal) Collected: 12/07/22 1158    Specimen: Cord Blood Venous from Umbilical Cord Updated: 12/07/22 1204     Site N/A     Comment: N/A        pH, Cord Venous 7.383 pH Units      pCO2, Cord Venous 39.2 mm Hg      pO2, Cord Venous 32.2 mm Hg      HCO3, Cord Venous 23.4 mmol/L      Base Excess, Cord Venous -1.5 mmol/L      O2 Sat, Cord Venous --     Comment: Direct O2 saturation result not reported at this site.        Barometric Pressure for Blood Gas 753.3 mmHg      Comment: [id number] Meter: [id number] : [id number] Madi Quintero        Modality Cannula     Flow Rate 3 lpm      O2 Saturation Calculated 61.2 %      Note --     Collection Time --    Protein / Creatinine Ratio, Urine - Urine, Random Void [[id number]] Collected: 12/07/22 1001    Specimen: Urine, Random Void Updated: 12/07/22 1144     Protein/Creatinine Ratio, Urine 156.3 mg/G Crea      Creatinine, Urine 117.1 mg/dL      Total Protein, Urine 18.3 mg/dL     URINE DRUG SCREEN PLUS BUPRENORPHINE - Urine, Random Void [[id number]] Collected: 12/07/22 1001    Specimen: Urine, Random Void Updated: 12/07/22 1144    Narrative:      The following orders were created for panel order URINE DRUG SCREEN PLUS BUPRENORPHINE - Urine, Random Void.  Procedure                               Abnormality         Status                     ---------                               -----------         ------                     Urine Drug Screen - Urin...[[id number]]  Normal              Final result               Buprenorphine Screen  Uri...[[id number]]                      In process                   Please view results for these tests on the individual orders.    Urine Drug Screen - Urine, Random Void [[id number]]  (Normal) Collected: 12/07/22 1001    Specimen: Urine, Random Void Updated: 12/07/22 1144     Amphet/Methamphet, Screen Negative     Barbiturates Screen, Urine Negative     Benzodiazepine Screen, Urine Negative     Cocaine Screen, Urine Negative     Opiate Screen Negative     THC, Screen, Urine Negative     Methadone Screen, Urine Negative     Oxycodone Screen, Urine Negative    Narrative:      Negative Thresholds Per Drugs Screened:    Amphetamines                 500 ng/ml  Barbiturates                 200 ng/ml  Benzodiazepines              100 ng/ml  Cocaine                      300 ng/ml  Methadone                    300 ng/ml  Opiates                      300 ng/ml  Oxycodone                    100 ng/ml  THC                           50 ng/ml    The Normal Value for all drugs tested is negative. This report includes final unconfirmed screening results to be used for medical treatment purposes only. Unconfirmed results must not be used for non-medical purposes such as employment or legal testing. Clinical consideration should be applied to any drug of abuse test, particularly when unconfirmed results are used.            Respiratory Panel PCR w/COVID-19(SARS-CoV-2) BERTHA/KRIS/RORO/PAD/COR/MAD/MARTHA In-House, NP Swab in UTM/VTM, 3-4 HR TAT - Swab, Nasopharynx [[id number]]  (Abnormal) Collected: 12/07/22 0902    Specimen: Swab from Nasopharynx Updated: 12/07/22 1051     ADENOVIRUS, PCR Not Detected     Coronavirus 229E Not Detected     Coronavirus HKU1 Not Detected     Coronavirus NL63 Not Detected     Coronavirus OC43 Not Detected     COVID19 Not Detected     Human Metapneumovirus Not Detected     Human Rhinovirus/Enterovirus Not Detected     Influenza A H1 2009 PCR Detected     Influenza B PCR Not Detected     Parainfluenza Virus 1 Not  Detected     Parainfluenza Virus 2 Not Detected     Parainfluenza Virus 3 Not Detected     Parainfluenza Virus 4 Not Detected     RSV, PCR Not Detected     Bordetella pertussis pcr Not Detected     Bordetella parapertussis PCR Not Detected     Chlamydophila pneumoniae PCR Not Detected     Mycoplasma pneumo by PCR Not Detected    Narrative:      In the setting of a positive respiratory panel with a viral infection PLUS a negative procalcitonin without other underlying concern for bacterial infection, consider observing off antibiotics or discontinuation of antibiotics and continue supportive care. If the respiratory panel is positive for atypical bacterial infection (Bordetella pertussis, Chlamydophila pneumoniae, or Mycoplasma pneumoniae), consider antibiotic de-escalation to target atypical bacterial infection.    Comprehensive Metabolic Panel [[id number]]  (Abnormal) Collected: 12/07/22 0855    Specimen: Blood from Arm, Left Updated: 12/07/22 1039     Glucose 76 mg/dL      BUN 5 mg/dL      Creatinine 0.51 mg/dL      Sodium 138 mmol/L      Potassium 3.6 mmol/L      Comment: Slight hemolysis detected by analyzer. Results may be affected.        Chloride 103 mmol/L      CO2 21.0 mmol/L      Calcium 8.7 mg/dL      Total Protein 5.9 g/dL      Albumin 3.50 g/dL      ALT (SGPT) <5 U/L      AST (SGOT) 17 U/L      Alkaline Phosphatase 171 U/L      Total Bilirubin 0.3 mg/dL      Globulin 2.4 gm/dL      A/G Ratio 1.5 g/dL      BUN/Creatinine Ratio 9.8     Anion Gap 14.0 mmol/L      eGFR 133.0 mL/min/1.73      Comment: National Kid[employer] Foundation and American Society of Nephrology (ASN) Task Force recommended calculation based on the Chronic Kidney Disease Epidemiology Collaboration (CKD-EPI) equation refit without adjustment for race.       Narrative:      GFR Normal >60  Chronic Kidney Disease <60  Kidney Failure <15      Buprenorphine Screen Urine - Urine, Random Void [[id number]] Collected: 12/07/22 1001    Specimen: Urine,  Random Void Updated: 22 1012          ASSESSMENT AND PLAN:    Principal Problem:    Previous  section  Overview:  Active Problems:    Late prenatal care  Overview:    Large for dates  Overview:    Polyhydramnios affecting pregnancy  Overview:     delivery delivered  Overview:    Influenza A  Overview:  Resolved Problems:    * No resolved hospital problems. *      Patient is postop day 1 from LTCS  Patient is doing well  Advance diet as tolerated      Regular diet   Encourage ambulation     Sagar Pritchard MD    2022  09:43 EST

## 2022-12-08 NOTE — PAYOR COMM NOTE
"Mary Chandler (27 y.o. Female)   [facility]    [address] [place] [address]  Facility NPI: [id number]    [facility]  Fax: [phone]  Phone: [phone] (Michelle: [id number])    Subject: MATERNITY ADMISSION AUTH REQUEST   Reference #: [id number]  Please don't hesitate to contact me with any questions or concerns.        Date of Birth   1995    Social Security Number       Address   [address] [place] [address]    Home Phone   [phone]    MRN   [id number]       Restorationist   None    Marital Status   Single                            Admission Date   22    Admission Type   Emergency    Admitting Provider   Annette Lantigua MD    Attending Provider   Annette Lantigua MD    Department, Room/Bed   [facility], E361/1       Discharge Date       Discharge Disposition       Discharge Destination                               Attending Provider: Annette Lantigua MD    Allergies: No Known Allergies    Isolation: Droplet   Infection: Influenza (22)   Code Status: CPR    Ht: 162.6 cm (64\")   Wt: 67 kg (147 lb 12.8 oz)    Admission Cmt: None   Principal Problem: Previous  section [Z98.891]                 Active Insurance as of 2022     Primary Coverage     Payor Plan Insurance Group Employer/Plan Group    [employer] [place] [employer] [place]ICA[place]      Payor Plan Address Payor Plan Phone Number Payor Plan Fax Number Effective Dates    [address] [address] [phone]  2022 - None Entered    [facility] [id number]       Subscriber Name Subscriber Birth Date Member ID       MARY CHANDLER 1995 [id number]                 Emergency Contacts      (Rel.) Home Phone Work Phone Mobile Phone    MILLIE AGUILA (Mother) -- -- [phone]            Insurance Information                [facility] MEDICAID Phone: [phone]    Subscriber: Mary Chandler Subscriber#: [id number]    Group#: -- Precert#: --    "       Problem List           Codes Noted - Resolved       Hospital    Polyhydramnios affecting pregnancy ICD-10-CM: O40.9XX0  ICD-9-CM: 657.00 2022 - Present     delivery delivered ICD-10-CM: O82  ICD-9-CM: 669.71 2022 - Present    Influenza A ICD-10-CM: J10.1  ICD-9-CM: 487.1 2022 - Present    Large for dates ICD-10-CM: P08.1  ICD-9-CM: 766.1 2022 - Present    Late prenatal care ICD-10-CM: O09.30  ICD-9-CM: V23.7 2022 - Present    * (Principal) Previous  section ICD-10-CM: Z98.891  ICD-9-CM: V45.89 2022 - Present       Non-Hospital    Polyhydramnios in third trimester ICD-10-CM: O40.3XX0  ICD-9-CM: 657.03 2022 - Present    Anxiety ICD-10-CM: F41.9  ICD-9-CM: 300.00 2022 - Present        History & Physical      Annette Lantigua MD at 22 0940          [facility]  Obstetric History and Physical    Chief Complaint   Patient presents with   • Scheduled      Pt repeat c/s at 38 wks and 0 days for poly. Pt denies ctx, LOF, and vaginal bleeding. Pt reports good fetal movement.        Subjective      Patient is a 27 y.o. female  currently at 38w0d, who presents for scheduled  due to polyhydramnios. She has been noting increasing shortness of air. She notes contractions started last pm and remain mild but are continuing. She denies bleeding or leaking fluid. She is feeling fetal movement.    She also reports she noted a cough since yesterday and had a low grade temp yesterday. She denies acute soa or n/v. She denies severe headache or vision changes.     Pt desires repeat c/s for this delivery. She has been counseled regarding risks of surgery to include bleeding, transfusion, infection, damage to internal organs, anesthetic complications, DVT/PE and death. Discussed tubal sterilization and pt declined. She has been counseled regarding increased risks of complications with multiple  deliveries and that future pregnancies  are not advised. Pt notes understanding.     Her prenatal care is complicated by .  Patient Active Problem List   Diagnosis   • Late prenatal care   • Previous  section   • Anxiety   • Large for dates   • Polyhydramnios in third trimester   • Polyhydramnios affecting pregnancy     Her previous obstetric/gynecological history is noted for c/s X 2    The following portions of the patients history were reviewed and updated as appropriate: current medications, allergies, past medical history, past surgical history, past family history, past social history and problem list .       Prenatal Information:  Prenatal Results     POC Urine Glucose/Protein     Test Value Reference Range Date Time    Urine Glucose  Negative mg/dL Negative 22 1534    Urine Protein  Negative mg/dL Negative 22 1534          Initial Prenatal Labs     Test Value Reference Range Date Time    Hemoglobin        Hematocrit        Platelets        Rubella IgG  1.87 index Immune >0.99 22 1159    Hepatitis B SAg  Negative  Negative 22 1159    Hepatitis C Ab  0.1 s/co ratio 0.0 - 0.9 22 1159    RPR  Non Reactive  Non Reactive 22 1159    ABO  A   22 1159    Rh  Positive   22 1159    Antibody Screen        HIV  Non Reactive  Non Reactive 22 1159    Urine Culture  Final report   22 0944    Gonorrhea  Negative  Negative 22 1133    Chlamydia  Negative  Negative 22 1133    TSH        HgB A1c   5.2 % 4.8 - 5.6 22 1159          2nd and 3rd Trimester     Test Value Reference Range Date Time    Hemoglobin (repeated)  12.2 g/dL 12.0 - 15.9 22 0858       11.1 g/dL 12.0 - 15.9 22 1439       10.4 g/dL 11.1 - 15.9 22 1456       10.5 g/dL 11.1 - 15.9 22 1159    Hematocrit (repeated)  37.5 % 34.0 - 46.6 22 0858       33.7 % 34.0 - 46.6 22 1439       32.6 % 34.0 - 46.6 22 1456       31.9 % 34.0 - 46.6 22 1159    Platelets   186 10*3/mm3 140 -  450 12/07/22 0858       182 10*3/mm3 140 - 450 11/30/22 1439       232 x10E3/uL 150 - 450 11/14/22 1456       220 x10E3/uL 150 - 450 11/07/22 1159    GCT  122 mg/dL 70 - 139 11/14/22 1456    Antibody Screen (repeated)  Negative  Negative 11/07/22 1159    GTT Fasting ^ 62 mg/dL  01/13/16     GTT 1 Hr        GTT 2 Hr        GTT 3 Hr        Group B Strep  Negative  Negative 11/07/22 1133          Drug Screening     Test Value Reference Range Date Time    Amphetamine Screen  Negative ng/mL Gbrrnm=5587 11/07/22 0944    Barbiturate Screen  Negative ng/mL Tfjkvi=953 11/07/22 0944    Benzodiazepine Screen  Negative ng/mL Bqsqlw=805 11/07/22 0944    Methadone Screen  Negative ng/mL Rxkbrn=384 11/07/22 0944    Phencyclidine Screen  Negative ng/mL Cutoff=25 11/07/22 0944    Opiates Screen  Negative ng/mL Orwwkj=084 11/07/22 0944    THC Screen  Negative ng/mL Cutoff=20 11/07/22 0944    Cocaine Screen  Negative ng/mL Inyjpg=347 11/07/22 0944    Propoxyphene Screen  Negative ng/mL Auxphf=967 11/07/22 0944    Buprenorphine Screen        Methamphetamine Screen        Oxycodone Screen        Tricyclic Antidepressants Screen              Other (Risk screening)     Test Value Reference Range Date Time    Varicella IgG ^ had infection   11/07/22     Parvovirus IgG        CMV IgG        Cystic Fibrosis        Hemoglobin electrophoresis        NIPT        MSAFP-4        AFP (for NTD only)              Legend    ^: Historical                      External Prenatal Results     Pregnancy Outside Results - Transcribed From Office Records - See Scanned Records For Details     Test Value Date Time    ABO  A  11/07/22 1159    Rh  Positive  11/07/22 1159    Antibody Screen  Negative  11/07/22 1159    Varicella IgG ^ had infection  11/07/22     Rubella  1.87 index 11/07/22 1159    Hgb  12.2 g/dL 12/07/22 0858       11.1 g/dL 11/30/22 1439       10.4 g/dL 11/14/22 1456       10.5 g/dL 11/07/22 1159    Hct  37.5 % 12/07/22 0858       33.7 %  22 1439       32.6 % 22 1456       31.9 % 22 1159    Glucose Fasting GTT ^ 62 mg/dL 16     Glucose Tolerance Test 1 hour       Glucose Tolerance Test 3 hour       Gonorrhea (discrete)  Negative  22 1133    Chlamydia (discrete)  Negative  22 1133    RPR  Non Reactive  22 1159    VDRL       Syphilis Antibody       HBsAg  Negative  22 1159    Herpes Simplex Virus PCR       Herpes Simplex VIrus Culture       HIV  Non Reactive  22 1159    Hep C RNA Quant PCR       Hep C Antibody  0.1 s/co ratio 22 1159    AFP       Group B Strep  Negative  22 1133    GBS Susceptibility to Clindamycin       GBS Susceptibility to Erythromycin       Fetal Fibronectin       Genetic Testing, Maternal Blood             Drug Screening     Test Value Date Time    Urine Drug Screen       Amphetamine Screen  Negative ng/mL 22 0944    Barbiturate Screen  Negative ng/mL 22 0944    Benzodiazepine Screen  Negative ng/mL 22 0944    Methadone Screen  Negative ng/mL 22 0944    Phencyclidine Screen  Negative ng/mL 22 0944    Opiates Screen  Negative  16 1708    THC Screen  Negative  16 1708    Cocaine Screen       Propoxyphene Screen  Negative ng/mL 22 0944    Buprenorphine Screen       Methamphetamine Screen       Oxycodone Screen       Tricyclic Antidepressants Screen             Legend    ^: Historical                         Past OB History:     OB History    Para Term  AB Living   3 2 2 0 0 2   SAB IAB Ectopic Molar Multiple Live Births   0 0 0 0 0 2      # Outcome Date GA Lbr Jeet/2nd Weight Sex Delivery Anes PTL Lv   3 Current            2 Term 17 38w6d  3185 g (7 lb 0.4 oz) M CS-LTranv Spinal N CHRISTOPHER      Birth Comments: PANDA Or1      Name: JEOVANNY BAGLEY      Apgar1: 8  Apgar5: 9   1 Term 16 37w1d  2540 g (5 lb 9.6 oz) F CS-LTranv Spinal N CHRISTOPHER      Complications: IUGR (intrauterine growth restriction)  affecting care of mother, Oligohydramnios in pacheco pregnancy in third trimester, Breech presentation      Apgar1: 8  Apgar5: 9       Past Medical History: Past Medical History:   Diagnosis Date   • Anxiety     does not take anything   • Asthma     childhood   • Depression     current, denies any thoughts of self-harm, not medicated   • Herpes     no issues during pregnancy   • Ovarian cyst       Past Surgical History Past Surgical History:   Procedure Laterality Date   • ADENOIDECTOMY     • APPENDECTOMY     •  SECTION N/A 3/16/2016    Procedure:  SECTION PRIMARY;  Surgeon: Marine Peterson MD;  Location: [facility] LABOR DELIVERY;  Service:    •  SECTION N/A 2017    Procedure:  SECTION REPEAT;  Surgeon: Marine Peterson MD;  Location: [facility] LABOR DELIVERY;  Service:    • TONSILLECTOMY        Family History: Family History   Problem Relation Age of Onset   • Heart defect Brother    • Heart defect Sister    • Congenital heart disease Neg Hx    • Breast cancer Neg Hx    • Ovarian cancer Neg Hx    • Uterine cancer Neg Hx    • Colon cancer Neg Hx       Social History:  reports that she has never smoked. She has never used smokeless tobacco.   reports that she does not currently use alcohol.   reports no history of drug use.        General ROS: Pertinent items are noted in HPI    Objective        Vital Signs Range for the last 24 hours  Temperature: Temp:  [98.8 °F (37.1 °C)] 98.8 °F (37.1 °C)   Temp Source: Temp src: Oral   BP: BP: (120-121)/(88-93) 120/88   Pulse: Heart Rate:  [117-118] 117   Respirations: Resp:  [18] 18   SPO2: SpO2:  [100 %] 100 %   O2 Amount (l/min):     O2 Devices Device (Oxygen Therapy): room air   Weight: Weight:  [67 kg (147 lb 12.8 oz)] 67 kg (147 lb 12.8 oz)     Physical Examination: General appearance - alert, well appearing, and in no distress  Mental status - alert, oriented to person, place, and time  Chest - clear to auscultation, no wheezes, rales  or rhonchi, symmetric air entry  Heart - normal rate and regular rhythm  Abdomen - gravid, soft, nontender  Neurological - alert, oriented, normal speech, no focal findings or movement disorder noted  Extremities - bilateral lower ext are without cords, tenderness or edema  Skin - normal coloration and turgor noted    Presentation: def   Cervix:      Dilation:     Effacement:     Station:         Fetal Heart Rate Assessment   Reactive tracing noted                              Uterine Assessment   Irregular ctx noted                                          Assessment & Plan       Previous  section    Late prenatal care    Large for dates    Polyhydramnios affecting pregnancy        Assessment:  1.  Intrauterine pregnancy at 38w0d gestation with reactive fetal status.    2.  Polyhydramnios with hx of prior c/s X 2: pt desires repeat c/s for delivery. Will proceed at this time due to symptomatic polyhydramnios.     Pt with mild cough and low grade temp yesterday: respiratory panel is pending.    Minimally increased BP: neg for preeclamptic symptoms, ordered CMP and urine protein/creat ratio. Will follow labs and BP's.  4.  GBS status:   Strep Gp B MERCEDES   Date Value Ref Range Status   2022 Negative Negative Final     Comment:     Centers for Disease Control and Prevention (CDC) and American Congress  of Obstetricians and Gynecologists (ACOG) guidelines for prevention of   group B streptococcal (GBS) disease specify co-collection of  a vaginal and rectal swab specimen to maximize sensitivity of GBS  detection. Per the CDC and ACOG, swabbing both the lower vagina and  rectum substantially increases the yield of detection compared with  sampling the vagina alone.  Penicillin G, ampicillin, or cefazolin are indicated for intrapartum  prophylaxis of  GBS colonization. Reflex susceptibility  testing should be performed prior to use of clindamycin only on GBS  isolates from penicillin-allergic  women who are considered a high risk  for anaphylaxis. Treatment with vancomycin without additional testing  is warranted if resistance to clindamycin is noted.         Plan:  1. Proceed with scheduled .   2. Plan of care has been reviewed with patient   3.  Risks, benefits of treatment plan have been discussed.  4.  All questions have been answered.        Annette Lantigua MD  2022  09:43 EST    Electronically signed by Annette Lantigua MD at 22 1004         Facility-Administered Medications as of 2022   Medication Dose Route Frequency Provider Last Rate Last Admin   • [COMPLETED] acetaminophen (TYLENOL) tablet 1,000 mg  1,000 mg Oral Once Annette Lantigua MD   1,000 mg at 22 1033   • [COMPLETED] acetaminophen (TYLENOL) tablet 1,000 mg  1,000 mg Oral Q6H Annette Lantigua MD   1,000 mg at 22 1038    Followed by   • acetaminophen (TYLENOL) tablet 650 mg  650 mg Oral Q6H Annette Lantigua MD   650 mg at 22 1718   • carboprost (HEMABATE) injection 250 mcg  250 mcg Intramuscular Q15 Min PRN Annette Lantigua MD       • [COMPLETED] ceFAZolin in dextrose (ANCEF) IVPB solution 2 g  2 g Intravenous Once Annette Lantigua MD   2 g at 22 1046   • diphenhydrAMINE (BENADRYL) capsule 25 mg  25 mg Oral Q4H PRN Jalil Fairbanks MD        Or   • diphenhydrAMINE (BENADRYL) injection 25 mg  25 mg Intravenous Q4H PRN Jalil Fairbanks MD        Or   • diphenhydrAMINE (BENADRYL) injection 25 mg  25 mg Intramuscular Q4H PRN Jalil Fairbanks MD       • [] erythromycin (ROMYCIN) 5 MG/GM ophthalmic ointment  - ADS Override Pull            • [COMPLETED] famotidine (PEPCID) injection 20 mg  20 mg Intravenous Once PRN Eddie Paris MD   20 mg at 22 1033   • ketorolac (TORADOL) injection 15 mg  15 mg Intravenous Q6H Annette Lantigua MD   15 mg at 22 1326    Followed by   • ibuprofen (ADVIL,MOTRIN) tablet 600 mg  600 mg Oral Q6H Annette Lantigua MD        • ketorolac (TORADOL) injection 30 mg  30 mg Intravenous Once Annette Lantigua MD       • [COMPLETED] lactated ringers bolus 1,000 mL  1,000 mL Intravenous Once Annette Lantigua MD   Stopped at 22 0952   • lanolin topical 1 application  1 application Topical Q1H PRN Annette Lantigua MD       • methylergonovine (METHERGINE) injection 200 mcg  200 mcg Intramuscular Once PRN Annette Lantigua MD       • methylergonovine (METHERGINE) injection 200 mcg  200 mcg Intramuscular Once PRN Annette Lantigua MD       • miSOPROStol (CYTOTEC) tablet 600 mcg  600 mcg Rectal Once PRN Annette Lantigua MD       • miSOPROStol (CYTOTEC) tablet 800 mcg  800 mcg Rectal Once PRN Annette Lantigua MD       • [] morphine injection 2 mg  2 mg Intravenous Q30 Min PRN Jalil Fairbanks MD       • naloxone (NARCAN) injection 0.2 mg  0.2 mg Intravenous Q15 Min PRN Jalil Fairbanks MD       • [COMPLETED] ondansetron (ZOFRAN) injection 4 mg  4 mg Intravenous Once PRN Eddie Paris MD   4 mg at 22 1033   • ondansetron (ZOFRAN) injection 4 mg  4 mg Intravenous Once PRN Jalil Fairbanks MD       • oxyCODONE (ROXICODONE) immediate release tablet 5 mg  5 mg Oral Q4H PRN Annette Lantigua MD        Or   • oxyCODONE (ROXICODONE) immediate release tablet 10 mg  10 mg Oral Q4H PRN Annette Lantigua MD       • [COMPLETED] oxytocin (PITOCIN) 30 units in 0.9% sodium chloride 500 mL (premix)  999 mL/hr Intravenous Once Annette Lantigua MD   Stopped at 22 1312    Followed by   • [] oxytocin (PITOCIN) 30 units in 0.9% sodium chloride 500 mL (premix)  250 mL/hr Intravenous Continuous Annette Lantigua MD       • [COMPLETED] oxytocin (PITOCIN) 30 units in 0.9% sodium chloride 500 mL (premix)  125 mL/hr Intravenous Continuous PRN Annette Lantigua  mL/hr at 22 1312 125 mL/hr at 22 1312   • [] phytonadione (VITAMIN K) 1 MG/0.5ML injection  - ADS Override Pull             • prenatal vitamin tablet 1 tablet  1 tablet Oral Daily Annette Lantigua MD       • [COMPLETED] Sod Citrate-Citric Acid (BICITRA) solution 30 mL  30 mL Oral Once Eddie Paris MD   30 mL at 12/07/22 1032     Lab Results (last 72 hours)     Procedure Component Value Units Date/Time    CBC & Differential [[id number]]  (Abnormal) Collected: 12/08/22 0520    Specimen: Blood Updated: 12/08/22 0626    Narrative:      The following orders were created for panel order CBC & Differential.  Procedure                               Abnormality         Status                     ---------                               -----------         ------                     CBC Auto Differential[[id number]]        Abnormal            Final result                 Please view results for these tests on the individual orders.    CBC Auto Differential [[id number]]  (Abnormal) Collected: 12/08/22 0520    Specimen: Blood Updated: 12/08/22 0626     WBC 9.15 10*3/mm3      RBC 4.37 10*6/mm3      Hemoglobin 12.7 g/dL      Hematocrit 38.4 %      MCV 87.9 fL      MCH 29.1 pg      MCHC 33.1 g/dL      RDW 17.9 %      RDW-SD 56.7 fl      MPV 10.9 fL      Platelets 184 10*3/mm3      Neutrophil % 77.4 %      Lymphocyte % 11.0 %      Monocyte % 9.7 %      Eosinophil % 0.3 %      Basophil % 0.3 %      Immature Grans % 1.3 %      Neutrophils, Absolute 7.07 10*3/mm3      Lymphocytes, Absolute 1.01 10*3/mm3      Monocytes, Absolute 0.89 10*3/mm3      Eosinophils, Absolute 0.03 10*3/mm3      Basophils, Absolute 0.03 10*3/mm3      Immature Grans, Absolute 0.12 10*3/mm3      nRBC 0.0 /100 WBC     Blood Gas, Venous, Cord [[id number]]  (Abnormal) Collected: 12/07/22 1158    Specimen: Cord Blood Venous from Umbilical Cord Updated: 12/07/22 1204     Site N/A     Comment: N/A        pH, Cord Venous 7.383 pH Units      pCO2, Cord Venous 39.2 mm Hg      pO2, Cord Venous 32.2 mm Hg      HCO3, Cord Venous 23.4 mmol/L      Base Excess, Cord Venous -1.5 mmol/L      O2  Sat, Cord Venous --     Comment: Direct O2 saturation result not reported at this site.        Barometric Pressure for Blood Gas 753.3 mmHg      Comment: [id number] Meter: [id number] : [id number] Madi Quintero        Modality Cannula     Flow Rate 3 lpm      O2 Saturation Calculated 61.2 %      Note --     Collection Time --    Protein / Creatinine Ratio, Urine - Urine, Random Void [[id number]] Collected: 12/07/22 1001    Specimen: Urine, Random Void Updated: 12/07/22 1144     Protein/Creatinine Ratio, Urine 156.3 mg/G Crea      Creatinine, Urine 117.1 mg/dL      Total Protein, Urine 18.3 mg/dL     URINE DRUG SCREEN PLUS BUPRENORPHINE - Urine, Random Void [[id number]] Collected: 12/07/22 1001    Specimen: Urine, Random Void Updated: 12/07/22 1144    Narrative:      The following orders were created for panel order URINE DRUG SCREEN PLUS BUPRENORPHINE - Urine, Random Void.  Procedure                               Abnormality         Status                     ---------                               -----------         ------                     Urine Drug Screen - Urin...[[id number]]  Normal              Final result               Buprenorphine Screen Uri...[[id number]]                      In process                   Please view results for these tests on the individual orders.    Urine Drug Screen - Urine, Random Void [[id number]]  (Normal) Collected: 12/07/22 1001    Specimen: Urine, Random Void Updated: 12/07/22 1144     Amphet/Methamphet, Screen Negative     Barbiturates Screen, Urine Negative     Benzodiazepine Screen, Urine Negative     Cocaine Screen, Urine Negative     Opiate Screen Negative     THC, Screen, Urine Negative     Methadone Screen, Urine Negative     Oxycodone Screen, Urine Negative    Narrative:      Negative Thresholds Per Drugs Screened:    Amphetamines                 500 ng/ml  Barbiturates                 200 ng/ml  Benzodiazepines              100 ng/ml  Cocaine                       300 ng/ml  Methadone                    300 ng/ml  Opiates                      300 ng/ml  Oxycodone                    100 ng/ml  THC                           50 ng/ml    The Normal Value for all drugs tested is negative. This report includes final unconfirmed screening results to be used for medical treatment purposes only. Unconfirmed results must not be used for non-medical purposes such as employment or legal testing. Clinical consideration should be applied to any drug of abuse test, particularly when unconfirmed results are used.            Respiratory Panel PCR w/COVID-19(SARS-CoV-2) BERTHA/KRIS/RORO/PAD/COR/MAD/MARTHA In-House, NP Swab in UTM/VTM, 3-4 HR TAT - Swab, Nasopharynx [[id number]]  (Abnormal) Collected: 12/07/22 0902    Specimen: Swab from Nasopharynx Updated: 12/07/22 1051     ADENOVIRUS, PCR Not Detected     Coronavirus 229E Not Detected     Coronavirus HKU1 Not Detected     Coronavirus NL63 Not Detected     Coronavirus OC43 Not Detected     COVID19 Not Detected     Human Metapneumovirus Not Detected     Human Rhinovirus/Enterovirus Not Detected     Influenza A H1 2009 PCR Detected     Influenza B PCR Not Detected     Parainfluenza Virus 1 Not Detected     Parainfluenza Virus 2 Not Detected     Parainfluenza Virus 3 Not Detected     Parainfluenza Virus 4 Not Detected     RSV, PCR Not Detected     Bordetella pertussis pcr Not Detected     Bordetella parapertussis PCR Not Detected     Chlamydophila pneumoniae PCR Not Detected     Mycoplasma pneumo by PCR Not Detected    Narrative:      In the setting of a positive respiratory panel with a viral infection PLUS a negative procalcitonin without other underlying concern for bacterial infection, consider observing off antibiotics or discontinuation of antibiotics and continue supportive care. If the respiratory panel is positive for atypical bacterial infection (Bordetella pertussis, Chlamydophila pneumoniae, or Mycoplasma pneumoniae), consider antibiotic  de-escalation to target atypical bacterial infection.    Comprehensive Metabolic Panel [[id number]]  (Abnormal) Collected: 12/07/22 0855    Specimen: Blood from Arm, Left Updated: 12/07/22 1039     Glucose 76 mg/dL      BUN 5 mg/dL      Creatinine 0.51 mg/dL      Sodium 138 mmol/L      Potassium 3.6 mmol/L      Comment: Slight hemolysis detected by analyzer. Results may be affected.        Chloride 103 mmol/L      CO2 21.0 mmol/L      Calcium 8.7 mg/dL      Total Protein 5.9 g/dL      Albumin 3.50 g/dL      ALT (SGPT) <5 U/L      AST (SGOT) 17 U/L      Alkaline Phosphatase 171 U/L      Total Bilirubin 0.3 mg/dL      Globulin 2.4 gm/dL      A/G Ratio 1.5 g/dL      BUN/Creatinine Ratio 9.8     Anion Gap 14.0 mmol/L      eGFR 133.0 mL/min/1.73      Comment: National Kidney Foundation and American Society of Nephrology (ASN) Task Force recommended calculation based on the Chronic Kidney Disease Epidemiology Collaboration (CKD-EPI) equation refit without adjustment for race.       Narrative:      GFR Normal >60  Chronic Kidney Disease <60  Kidney Failure <15      Buprenorphine Screen Urine - Urine, Random Void [[id number]] Collected: 12/07/22 1001    Specimen: Urine, Random Void Updated: 12/07/22 1012    CBC (No Diff) [[id number]]  (Abnormal) Collected: 12/07/22 0858    Specimen: Blood from Arm, Left Updated: 12/07/22 0919     WBC 7.96 10*3/mm3      RBC 4.34 10*6/mm3      Hemoglobin 12.2 g/dL      Hematocrit 37.5 %      MCV 86.4 fL      MCH 28.1 pg      MCHC 32.5 g/dL      RDW 18.0 %      RDW-SD 55.7 fl      MPV 11.1 fL      Platelets 186 10*3/mm3           Imaging Results (Last 72 Hours)     ** No results found for the last 72 hours. **        ECG/EMG Results (last 72 hours)     ** No results found for the last 72 hours. **        Orders (last 7 days)      Start     Ordered    12/08/22 1945  ibuprofen (ADVIL,MOTRIN) tablet 600 mg  Every 6 Hours        See Hyperspace for full Linked Orders Report.    12/07/22 5052     12/08/22 1645  acetaminophen (TYLENOL) tablet 650 mg  Every 6 Hours        See Hyperspace for full Linked Orders Report.    12/07/22 1556    12/08/22 0600  Discontinue Indwelling Urinary Catheter in AM  Once         12/07/22 1556    12/08/22 0600  Abdominal Wound Care  Per Order Details        Comments: Postop Day 1. Remove Dressing & Leave Incision Open to Air.    12/07/22 1556    12/08/22 0600  CBC & Differential  Morning Draw         12/07/22 1556    12/08/22 0600  CBC Auto Differential  PROCEDURE ONCE         12/07/22 2205    12/08/22 0000  Remove Abdominal Dressing  Once         12/07/22 1556    12/07/22 1945  ketorolac (TORADOL) injection 15 mg  Every 6 Hours        See Hyperspace for full Linked Orders Report.    12/07/22 1556    12/07/22 1936  Inpatient Case Management  Consult  Once        Provider:  (Not yet assigned)    12/07/22 1937    12/07/22 1800  Ambulate Patient  2 Times Daily      Comments: After anesthesia wears off.    12/07/22 1556    12/07/22 1645  prenatal vitamin tablet 1 tablet  Daily         12/07/22 1556    12/07/22 1645  ketorolac (TORADOL) injection 30 mg  Once         12/07/22 1555    12/07/22 1600  Incentive spirometry RT  Every Hour       12/07/22 1556    12/07/22 1557  Transfer to postpartum when discharge criteria met.  Until Discontinued         12/07/22 1556    12/07/22 1557  Transfer Patient  Once         12/07/22 1556    12/07/22 1557  Code Status and Medical Interventions:  Continuous         12/07/22 1556    12/07/22 1557  Vital Signs Per Hospital Policy  Per Hospital Policy         12/07/22 1556    12/07/22 1557  Notify Physician  Until Discontinued         12/07/22 1556    12/07/22 1557  Patient May Shower  Per Order Details        Comments: After Anesthesia Wears Off & With Assistance    12/07/22 1556    12/07/22 1557  Advance Diet as Tolerated  Until Discontinued         12/07/22 1556    12/07/22 1557  I/O  Every Shift       12/07/22 1556    12/07/22 1557   "Fundal and Lochia Check  Per Order Details        Comments: Every 30 Minutes x2, Every 1 Hour x4, Every 4 Hours x24 Hours, Then Every Shift.    12/07/22 1556    12/07/22 1557  Weigh Patient  Once         12/07/22 1556    12/07/22 1557  Continue Indwelling Urinary Catheter Already in Place  Once         12/07/22 1556    12/07/22 1557  Notify Provider if Bladder Distention Continues  Until Discontinued         12/07/22 1556    12/07/22 1557  Urinary Catheter Care  Every Shift,   Status:  Canceled       12/07/22 1556    12/07/22 1557  Turn Cough Deep Breathe  Once         12/07/22 1556    12/07/22 1557  Encourage early intake of PO fluids  Continuous         12/07/22 1556    12/07/22 1557  Ambulate Patient 3-5 times per day (with or without Juárez)  Every Shift       12/07/22 1556    12/07/22 1557  Apply Abdominal Binding Until Discontinued  Until Discontinued         12/07/22 1556 12/07/22 1557  \"If patient tolerates food and liquids after completion of second bag of Pitocin, saline lock IV and discontinue IV fluid infusions.  Once         12/07/22 1556    12/07/22 1557  Breast pump to bed  Once         12/07/22 1556    12/07/22 1557  If indicated -- Please administer RH Immunoglobulin based on results of cord blood evaluation and fetal screen lab tests, pharmacy to dispense  Per Order Details        Comments: See Process Instructions For Reference Range Details.    12/07/22 1556    12/07/22 1557  Place Sequential Compression Device  Once         12/07/22 1556    12/07/22 1557  Maintain Sequential Compression Device  Continuous         12/07/22 1556    12/07/22 1556  acetaminophen (TYLENOL) tablet 1,000 mg  Every 6 Hours        See Hyperspace for full Linked Orders Report.    12/07/22 1556    12/07/22 1556  oxyCODONE (ROXICODONE) immediate release tablet 5 mg  Every 4 Hours PRN        See Hyperspace for full Linked Orders Report.    12/07/22 1556    12/07/22 1556  oxyCODONE (ROXICODONE) immediate release tablet 10 " mg  Every 4 Hours PRN        See Hyperspace for full Linked Orders Report.    12/07/22 1556    12/07/22 1556  miSOPROStol (CYTOTEC) tablet 600 mcg  Once As Needed         12/07/22 1556    12/07/22 1556  methylergonovine (METHERGINE) injection 200 mcg  Once As Needed         12/07/22 1556    12/07/22 1556  lanolin topical 1 application  Every 1 Hour PRN         12/07/22 1556    12/07/22 1556  Vital Signs Per Hospital Policy  Per Hospital Policy         12/07/22 1555    12/07/22 1556  Strict Bed Rest  Until Discontinued         12/07/22 1555    12/07/22 1556  Fundal & Lochia Check  Per Order Details        Comments: Every 15 Minutes x4, Then Every 30 Minutes x2, Then Every Shift    12/07/22 1555    12/07/22 1556  Notify Provider (Specified)  Until Discontinued         12/07/22 1555    12/07/22 1556  Diet: Regular/House Diet; Texture: Regular Texture (IDDSI 7); Fluid Consistency: Thin (IDDSI 0)  Diet Effective Now         12/07/22 1555    12/07/22 1555  Fundal & Lochia Check  Every Shift       12/07/22 1555    12/07/22 1555  methylergonovine (METHERGINE) injection 200 mcg  Once As Needed         12/07/22 1555    12/07/22 1555  carboprost (HEMABATE) injection 250 mcg  Every 15 Minutes PRN         12/07/22 1555    12/07/22 1555  miSOPROStol (CYTOTEC) tablet 800 mcg  Once As Needed         12/07/22 1555    12/07/22 1253  oxytocin (PITOCIN) 30 units in 0.9% sodium chloride 500 mL (premix)  Continuous PRN         12/07/22 1253    12/07/22 1253  Vital Signs  Per Hospital Policy         12/07/22 1252    12/07/22 1253  IV Site Care  Continuous         12/07/22 1252    12/07/22 1253  Oxygen Therapy- Nasal Cannula; 2 LPM; Titrate for SPO2: equal to or greater than, 92%  Continuous,   Status:  Canceled         12/07/22 1252    12/07/22 1253  Continuous Pulse Oximetry  Continuous         12/07/22 1252    12/07/22 1253  Respirations  Continuous        Comments: If respiratory rate is less than 10/min, notify the Anesthesiologist     12/07/22 1252    12/07/22 1253  If respiratory is less than 8/min, see Narcan order below. Notify Anesthesiologist STAT.  Until Discontinued         12/07/22 1252    12/07/22 1253  Blood Pressure and Pulse Every 4 Hours  Continuous,   Status:  Canceled         12/07/22 1252    12/07/22 1253  Activity & Removal of Juárez Catheter, Per Obstetrician  Continuous,   Status:  Canceled         12/07/22 1252    12/07/22 1253  Notify Anesthesiologist for Any Questions / Problems  Continuous,   Status:  Canceled         12/07/22 1252    12/07/22 1253  Notify Anesthesia for Temp Over 101.4F  Continuous,   Status:  Canceled         12/07/22 1252    12/07/22 1252  ondansetron (ZOFRAN) injection 4 mg  Once As Needed         12/07/22 1252    12/07/22 1252  diphenhydrAMINE (BENADRYL) capsule 25 mg  Every 4 Hours PRN        See Hyperspace for full Linked Orders Report.    12/07/22 1252    12/07/22 1252  diphenhydrAMINE (BENADRYL) injection 25 mg  Every 4 Hours PRN        See Hyperspace for full Linked Orders Report.    12/07/22 1252    12/07/22 1252  diphenhydrAMINE (BENADRYL) injection 25 mg  Every 4 Hours PRN        See Hyperspace for full Linked Orders Report.    12/07/22 1252    12/07/22 1252  naloxone (NARCAN) injection 0.2 mg  Every 15 Minutes PRN         12/07/22 1252    12/07/22 1252  HYDROmorphone (DILAUDID) injection 0.5 mg  Every 30 Minutes PRN,   Status:  Discontinued         12/07/22 1252    12/07/22 1252  morphine injection 2 mg  Every 30 Minutes PRN         12/07/22 1252    12/07/22 1205  Blood Gas, Venous, Cord  PROCEDURE ONCE         12/07/22 1158    12/07/22 1138  Blood Gas, Venous, Cord  Once,   Status:  Canceled         12/07/22 1137    12/07/22 1056  erythromycin (ROMYCIN) 5 MG/GM ophthalmic ointment  - ADS Override Pull        Note to Pharmacy: Created by cabinet override    12/07/22 1056    12/07/22 1056  phytonadione (VITAMIN K) 1 MG/0.5ML injection  - ADS Override Pull        Note to Pharmacy: Created by  cabinet override    12/07/22 1056    12/07/22 1045  Sod Citrate-Citric Acid (BICITRA) solution 30 mL  Once         12/07/22 0946    12/07/22 1020  Inpatient Case Management  Consult  Once,   Status:  Canceled        Provider:  (Not yet assigned)    12/07/22 1020    12/07/22 0946  Nurse may remove epidural catheter after delivery.  Until Discontinued,   Status:  Canceled         12/07/22 0946    12/07/22 0946  Notify physician for the following conditions:  Until Discontinued,   Status:  Canceled         12/07/22 0946    12/07/22 0946  ondansetron (ZOFRAN) injection 4 mg  Once As Needed         12/07/22 0946    12/07/22 0946  famotidine (PEPCID) injection 20 mg  Once As Needed         12/07/22 0946    12/07/22 0942  URINE DRUG SCREEN PLUS BUPRENORPHINE - Urine, Random Void  Once         12/07/22 0941    12/07/22 0942  Urine Drug Screen - Urine, Random Void  PROCEDURE ONCE         12/07/22 0941    12/07/22 0942  Buprenorphine Screen Urine - Urine, Random Void  PROCEDURE ONCE         12/07/22 0941    12/07/22 0936  Comprehensive Metabolic Panel  Once         12/07/22 0935    12/07/22 0936  Protein / Creatinine Ratio, Urine - Urine, Random Void  Once         12/07/22 0935    12/07/22 0930  oxytocin (PITOCIN) 30 units in 0.9% sodium chloride 500 mL (premix)  Continuous        See Hyperspace for full Linked Orders Report.    12/07/22 0821    12/07/22 0915  sodium chloride 0.9 % flush 10 mL  Every 12 Hours Scheduled,   Status:  Discontinued         12/07/22 0821    12/07/22 0915  lactated ringers bolus 1,000 mL  Once         12/07/22 0821    12/07/22 0915  lactated ringers infusion  Continuous,   Status:  Discontinued         12/07/22 0821    12/07/22 0915  acetaminophen (TYLENOL) tablet 1,000 mg  Once         12/07/22 0821    12/07/22 0915  oxytocin (PITOCIN) 30 units in 0.9% sodium chloride 500 mL (premix)  Once        See Hyperspace for full Linked Orders Report.    12/07/22 0821    12/07/22 0915   ceFAZolin in dextrose (ANCEF) IVPB solution 2 g  Once         22 0821    22  Respiratory Panel PCR w/COVID-19(SARS-CoV-2) BERTHA/KRIS/ORRO/PAD/COR/MAD/MARTHA In-House, NP Swab in UTM/VTM, 3-4 HR TAT - Swab, Nasopharynx  Once         22 0859    22  Admit To Obstetrics Inpatient  Once         2221    22  Obtain Informed Consent  Once,   Status:  Canceled         22  Vital Signs Per Hospital Policy  Per Hospital Policy,   Status:  Canceled         22  Initiate Group Beta Strep (GBS) Prophylaxis Protocol, If Criteria Met  Continuous,   Status:  Canceled        Comments: NO TREATMENT RECOMMENDED IF: 1) Maternal GBS Status Known Negative 2) Scheduled  Birth With Intact Membranes, Not in Labor 3) Maternal GBS Status Unknown, No Risk Factors  TREAT WITH ANTIBIOTICS IF:  1) Maternal GBS Status Known Positive 2) Maternal GBS Status Unknown With Risk Factors: a)  Previous Infant Affected By GBS Infection b) GBS Urinary Tract Infection (UTI) or Bacteriuria During Pregnancy c) Unexplained Maternal Fever (100.4F (38C) or Greater) During Labor d)  Prolonged Rupture of Membranes (18 or More Hours) e) Gestational Age Less Than 37 Weeks    22  Insert Indwelling Urinary Catheter  Once,   Status:  Canceled        Comments: Follow Protocol As Outlined in Process Instructions (Open Order Report to View Full Instructions)   See Hyperspace for full Linked Orders Report.    22  Assess Need for Indwelling Urinary Catheter - Follow Removal Protocol  Continuous,   Status:  Canceled        Comments: Follow Protocol As Outlined in Process Instructions (Open Order Report to View Full Instructions)   See Hyperspace for full Linked Orders Report.    22  Abdominal Prep With Clippers  Once,   Status:  Canceled         22   Chlorhexadine Skin Prep Unless Otherwise Indicated  Once,   Status:  Canceled         12/07/22 0821    12/07/22 0822  SCD (Sequential Compression Devices)  Once,   Status:  Canceled         12/07/22 0821 12/07/22 0822  POC Glucose Once  Once,   Status:  Canceled         12/07/22 0821 12/07/22 0822  Document Gatorade Consumption Prior to Admission (Yes or No)  Once,   Status:  Canceled         12/07/22 0821    12/07/22 0822  Continuous Fetal Monitoring With NST on Admission and Prior to Initiation of Oxytocin.  Per Order Details,   Status:  Canceled        Comments: Continuous Fetal Monitoring With NST on Admission & Prior to Initiation of Oxytocin.    12/07/22 0821 12/07/22 0822  External Uterine Contraction Monitoring  Per Hospital Policy,   Status:  Canceled         12/07/22 0821    12/07/22 0822  Notify Provider (Specified)  Until Discontinued,   Status:  Canceled         12/07/22 0821 12/07/22 0822  Notify Provider of Tachysystole (Per Hospital Algorithm)  Until Discontinued,   Status:  Canceled         12/07/22 0821 12/07/22 0822  Notify Provider if Membranes Ruptured, Bleeding Greater Than 1 Pad Per Hour, Fetal Heart Tone Abnormality or Severe Pain  Until Discontinued,   Status:  Canceled         12/07/22 0821 12/07/22 0822  NPO Diet NPO Type: Ice Chips  Diet Effective Now,   Status:  Canceled         12/07/22 0821 12/07/22 0822  Inpatient Anesthesiology Consult  Once,   Status:  Canceled        Specialty:  Anesthesiology  Provider:  (Not yet assigned)    12/07/22 0821 12/07/22 0822  Type & Screen  Once         12/07/22 0821 12/07/22 0822  CBC (No Diff)  STAT         12/07/22 0821 12/07/22 0822  Insert Peripheral IV  Once,   Status:  Canceled         12/07/22 0821 12/07/22 0822  Saline Lock & Maintain IV Access  Continuous,   Status:  Canceled         12/07/22 0821 12/07/22 0821  Urinary Catheter Care  Every Shift,   Status:  Canceled      See Hyperspace for full Linked  Orders Report.    22  sodium chloride 0.9 % flush 10 mL  As Needed,   Status:  Discontinued         22    Unscheduled  Blood Gas, Arterial -  As Needed       22 125    Unscheduled  Up with Assistance  As Needed       22    Unscheduled  Bladder Scan if Patient Unable to Void 4-6 Hours After Catheter Removal  As Needed         22    Unscheduled  Straight Cath Every 4-6 Hours As Needed If Patient is Unable to Void After 4-6 Hours, Bladder Scan Volume is Greater Than 500mL & Patient Has Symptoms of Bladder Discomfort / Distention  As Needed       22 155    Unscheduled  Schedule / Prompt Voiding For Patients With Urinary Incontinence  As Needed       22    Unscheduled  Chewing Gum  As Needed       22    Unscheduled  Apply witch hazel pads / TUCKS to perineum as needed for comfort PRN  As Needed       22    Unscheduled  Warm compress  As Needed       22    Unscheduled  Apply ace wrap, tight bra, or binder  As Needed       22    Unscheduled  Apply ice packs  As Needed       22                   Operative/Procedure Notes (last 72 hours)      Annette Lantigua MD at 22 1259        . SECTION FULL OPERATIVE REPORT  Pre-operative Diagnosis: 38 week Intrauterine pregnancy, Polyhydramnios, Previous  delivery X 2  Post-operative Diagnosis: same  Procedure: Repeat Low Transverse  Section  Anesthesia: spinal  Surgeon(s): Annette Lantigua MD  Assistant(s): Bryce Blanco MD  Infant: LV female infant, Apgars 8/9, weight 8lbs, 5.3oz  Findings: Normal uterus, tubes and ovaries  Complications: none  Specimens: placenta  EBL: 484 mL      Description of Procedure:  The patient was taken to the operating room where anesthesia was found to be adequate. She was prepped and draped in the usual sterile fashion in the dorsal supine position with a leftward tilt. A surgical  time-out was performed.   A Pfannenstiel skin incision was made with the scalpel and carried down to the underlying layer of fascia. The fascia was then incised in the midline and the incision was extended laterally with beasley scissors. The superior aspect of the fascia was then grasped with Kocher clamps and the underlying rectus muscles were then dissected off bluntly and with the Beasley scissors. The inferior aspect of the fascia was then grasped with Kocher clamps and dissected off similarly with Beasley scissors. The rectus muscles were  and the peritoneum entered. The peritoneal incision was then carried superiorly and inferiorly taking care to identify the bladder at the lower aspect.   The bladder blade was inserted. The vesicouterine peritoneum was then identified and entered sharply with Metzenbaum scissors and a bladder flap was created.The bladder blade was reinserted and the uterine incision was made in a low transverse fashion using the scalpel. This was extended bilaterally with bandage scissors.  The bladder blade was removed and the vertex was grasped and delivered atraumatically. The remainder of the infant was delivered without issue. The nose and mouth were suctioned and the cord was clamped and cut after 30 seconds. The infant was taken to the warmer for the waiting staff. Cord blood was collected. The placenta was removed with gentle manual traction. The uterus was exteriorized and cleared of all clots and debris. The uterine incision was repaired in two layers using 0 vicryl suture. A small area of bleeding at the mid incision was made hemostatic with a figure of 8 stitch of 0 vicryl. The uterus was examined and noted to be hemostatic and returned to the abdomen.  The posterior cul-de-sac and gutters were cleared of all clots and debris. The uterus was then re-inspected to ensure hemostasis as were all subfascial tissues. The fascia was re-approximated in a running fashion using 0-vicryl  "suture. The subcutaneous tissue was re-approximated in a running fashion with 3-0 vicryl. The skin was closed with 4-0 monocryl.     The patient tolerated the procedure well. All sponge, lap and needle counts were correct. She went to the recovery room in stable fashion.    The patient received Kefzol for antibiotic prophylaxis.     The surgical assistant was responsible for the following tasks:   Retraction, suturing, suctioning and assistance with delivery of the infant.  Their skilled assistance was necessary for the success of this case.          Annette Lantigua MD  2022    Electronically signed by Annette Lantigua MD at 22 1304     Annette Lantigua MD at 22 1305        See operative note    Electronically signed by Annette Lantigua MD at 22 1305          Physician Progress Notes (last 72 hours)      Sagar Pritchard MD at 22 0942           POSTPARTUM ROUNDS    Chief complaint: post C/S concerns  SUBJECTIVE:  Patient appears comfortable, and pain seems to be controlled with by mouth medicines.  She is ambulating. .  Discussed advancing activity and diet.       ROS:  Pulm: neg for soa  GI: neg for heavy bleeding  Musculoskel: neg for leg pain      OBJECTIVE:  Vital Signs: /69 (BP Location: Right arm, Patient Position: Sitting)   Pulse 68   Temp 98 °F (36.7 °C) (Oral)   Resp 16   Ht 162.6 cm (64\")   Wt 67 kg (147 lb 12.8 oz)   LMP  (LMP Unknown)   SpO2 98%   Breastfeeding No   BMI 25.37 kg/m²     Intake/Output Summary (Last 24 hours) at 2022 0943  Last data filed at 2022 0600  Gross per 24 hour   Intake 2052 ml   Output 2684 ml   Net -632 ml       Constitutional:  The patient is well nourished.  Cardiovascular:  RRR  Resp:  nonlabored breathing  The incision is normal  Gastrointestinal:  fundus firm below umbilicus  Extremities:  no edema,no evidence dvt    LABS / IMAGING:  Lab Results (last 24 hours)     Procedure Component Value " Units Date/Time    CBC & Differential [[id number]]  (Abnormal) Collected: 12/08/22 0520    Specimen: Blood Updated: 12/08/22 0626    Narrative:      The following orders were created for panel order CBC & Differential.  Procedure                               Abnormality         Status                     ---------                               -----------         ------                     CBC Auto Differential[[id number]]        Abnormal            Final result                 Please view results for these tests on the individual orders.    CBC Auto Differential [[id number]]  (Abnormal) Collected: 12/08/22 0520    Specimen: Blood Updated: 12/08/22 0626     WBC 9.15 10*3/mm3      RBC 4.37 10*6/mm3      Hemoglobin 12.7 g/dL      Hematocrit 38.4 %      MCV 87.9 fL      MCH 29.1 pg      MCHC 33.1 g/dL      RDW 17.9 %      RDW-SD 56.7 fl      MPV 10.9 fL      Platelets 184 10*3/mm3      Neutrophil % 77.4 %      Lymphocyte % 11.0 %      Monocyte % 9.7 %      Eosinophil % 0.3 %      Basophil % 0.3 %      Immature Grans % 1.3 %      Neutrophils, Absolute 7.07 10*3/mm3      Lymphocytes, Absolute 1.01 10*3/mm3      Monocytes, Absolute 0.89 10*3/mm3      Eosinophils, Absolute 0.03 10*3/mm3      Basophils, Absolute 0.03 10*3/mm3      Immature Grans, Absolute 0.12 10*3/mm3      nRBC 0.0 /100 WBC     Blood Gas, Venous, Cord [[id number]]  (Abnormal) Collected: 12/07/22 1158    Specimen: Cord Blood Venous from Umbilical Cord Updated: 12/07/22 1204     Site N/A     Comment: N/A        pH, Cord Venous 7.383 pH Units      pCO2, Cord Venous 39.2 mm Hg      pO2, Cord Venous 32.2 mm Hg      HCO3, Cord Venous 23.4 mmol/L      Base Excess, Cord Venous -1.5 mmol/L      O2 Sat, Cord Venous --     Comment: Direct O2 saturation result not reported at this site.        Barometric Pressure for Blood Gas 753.3 mmHg      Comment: [id number] Meter: [id number] : [id number] Madi Quintero        Modality Cannula     Flow Rate 3 lpm      O2  Saturation Calculated 61.2 %      Note --     Collection Time --    Protein / Creatinine Ratio, Urine - Urine, Random Void [[id number]] Collected: 12/07/22 1001    Specimen: Urine, Random Void Updated: 12/07/22 1144     Protein/Creatinine Ratio, Urine 156.3 mg/G Crea      Creatinine, Urine 117.1 mg/dL      Total Protein, Urine 18.3 mg/dL     URINE DRUG SCREEN PLUS BUPRENORPHINE - Urine, Random Void [[id number]] Collected: 12/07/22 1001    Specimen: Urine, Random Void Updated: 12/07/22 1144    Narrative:      The following orders were created for panel order URINE DRUG SCREEN PLUS BUPRENORPHINE - Urine, Random Void.  Procedure                               Abnormality         Status                     ---------                               -----------         ------                     Urine Drug Screen - Urin...[[id number]]  Normal              Final result               Buprenorphine Screen Uri...[[id number]]                      In process                   Please view results for these tests on the individual orders.    Urine Drug Screen - Urine, Random Void [[id number]]  (Normal) Collected: 12/07/22 1001    Specimen: Urine, Random Void Updated: 12/07/22 1144     Amphet/Methamphet, Screen Negative     Barbiturates Screen, Urine Negative     Benzodiazepine Screen, Urine Negative     Cocaine Screen, Urine Negative     Opiate Screen Negative     THC, Screen, Urine Negative     Methadone Screen, Urine Negative     Oxycodone Screen, Urine Negative    Narrative:      Negative Thresholds Per Drugs Screened:    Amphetamines                 500 ng/ml  Barbiturates                 200 ng/ml  Benzodiazepines              100 ng/ml  Cocaine                      300 ng/ml  Methadone                    300 ng/ml  Opiates                      300 ng/ml  Oxycodone                    100 ng/ml  THC                           50 ng/ml    The Normal Value for all drugs tested is negative. This report includes final unconfirmed  screening results to be used for medical treatment purposes only. Unconfirmed results must not be used for non-medical purposes such as employment or legal testing. Clinical consideration should be applied to any drug of abuse test, particularly when unconfirmed results are used.            Respiratory Panel PCR w/COVID-19(SARS-CoV-2) BERTHA/KRIS/RORO/PAD/COR/MAD/MARTHA In-House, NP Swab in UTM/VTM, 3-4 HR TAT - Swab, Nasopharynx [[id number]]  (Abnormal) Collected: 12/07/22 0902    Specimen: Swab from Nasopharynx Updated: 12/07/22 1051     ADENOVIRUS, PCR Not Detected     Coronavirus 229E Not Detected     Coronavirus HKU1 Not Detected     Coronavirus NL63 Not Detected     Coronavirus OC43 Not Detected     COVID19 Not Detected     Human Metapneumovirus Not Detected     Human Rhinovirus/Enterovirus Not Detected     Influenza A H1 2009 PCR Detected     Influenza B PCR Not Detected     Parainfluenza Virus 1 Not Detected     Parainfluenza Virus 2 Not Detected     Parainfluenza Virus 3 Not Detected     Parainfluenza Virus 4 Not Detected     RSV, PCR Not Detected     Bordetella pertussis pcr Not Detected     Bordetella parapertussis PCR Not Detected     Chlamydophila pneumoniae PCR Not Detected     Mycoplasma pneumo by PCR Not Detected    Narrative:      In the setting of a positive respiratory panel with a viral infection PLUS a negative procalcitonin without other underlying concern for bacterial infection, consider observing off antibiotics or discontinuation of antibiotics and continue supportive care. If the respiratory panel is positive for atypical bacterial infection (Bordetella pertussis, Chlamydophila pneumoniae, or Mycoplasma pneumoniae), consider antibiotic de-escalation to target atypical bacterial infection.    Comprehensive Metabolic Panel [[id number]]  (Abnormal) Collected: 12/07/22 0855    Specimen: Blood from Arm, Left Updated: 12/07/22 1039     Glucose 76 mg/dL      BUN 5 mg/dL      Creatinine 0.51 mg/dL       Sodium 138 mmol/L      Potassium 3.6 mmol/L      Comment: Slight hemolysis detected by analyzer. Results may be affected.        Chloride 103 mmol/L      CO2 21.0 mmol/L      Calcium 8.7 mg/dL      Total Protein 5.9 g/dL      Albumin 3.50 g/dL      ALT (SGPT) <5 U/L      AST (SGOT) 17 U/L      Alkaline Phosphatase 171 U/L      Total Bilirubin 0.3 mg/dL      Globulin 2.4 gm/dL      A/G Ratio 1.5 g/dL      BUN/Creatinine Ratio 9.8     Anion Gap 14.0 mmol/L      eGFR 133.0 mL/min/1.73      Comment: National Kidney Foundation and American Society of Nephrology (ASN) Task Force recommended calculation based on the Chronic Kidney Disease Epidemiology Collaboration (CKD-EPI) equation refit without adjustment for race.       Narrative:      GFR Normal >60  Chronic Kidney Disease <60  Kidney Failure <15      Buprenorphine Screen Urine - Urine, Random Void [[id number]] Collected: 22 1001    Specimen: Urine, Random Void Updated: 22 1012          ASSESSMENT AND PLAN:    Principal Problem:    Previous  section  Overview:  Active Problems:    Late prenatal care  Overview:    Large for dates  Overview:    Polyhydramnios affecting pregnancy  Overview:     delivery delivered  Overview:    Influenza A  Overview:  Resolved Problems:    * No resolved hospital problems. *      Patient is postop day 1 from LTCS  Patient is doing well  Advance diet as tolerated      Regular diet   Encourage ambulation     Sagar Pritchard MD    2022  09:43 EST      Electronically signed by Sagar Pritchard MD at 22 0943       Consult Notes (last 72 hours)  Notes from 22 1720 through 22 172   No notes of this type exist for this encounter.
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
## 2022-12-09 LAB — BUPRENORPHINE UR QL: NEGATIVE NG/ML

## 2022-12-09 PROCEDURE — 0503F POSTPARTUM CARE VISIT: CPT | Performed by: OBSTETRICS & GYNECOLOGY

## 2022-12-09 RX ADMIN — ACETAMINOPHEN 650 MG: 325 TABLET, FILM COATED ORAL at 01:30

## 2022-12-09 RX ADMIN — IBUPROFEN 600 MG: 600 TABLET, FILM COATED ORAL at 11:29

## 2022-12-09 RX ADMIN — ACETAMINOPHEN 650 MG: 325 TABLET, FILM COATED ORAL at 08:31

## 2022-12-09 RX ADMIN — ACETAMINOPHEN 650 MG: 325 TABLET, FILM COATED ORAL at 21:10

## 2022-12-09 RX ADMIN — IBUPROFEN 600 MG: 600 TABLET, FILM COATED ORAL at 17:47

## 2022-12-09 RX ADMIN — ACETAMINOPHEN 650 MG: 325 TABLET, FILM COATED ORAL at 15:04

## 2022-12-09 RX ADMIN — IBUPROFEN 600 MG: 600 TABLET, FILM COATED ORAL at 04:47

## 2022-12-09 NOTE — PROGRESS NOTES
Continued Stay Note  [facility]     Patient Name: Mary Chandler  MRN: [id number]  Today's Date: 12/9/2022    Admit Date: 12/7/2022    Plan: Infant may NOT d/c to mother; infant’s discharge TBD, awaiting CPS follow-up. CSW will follow cord. MOOK Nugent.   Discharge Plan     Row Name 12/09/22 0908       Plan    Plan Infant may NOT d/c to mother; infant’s discharge TBD, awaiting CPS follow-up. CSW will follow cord. MOOK Nugent.    Plan Comments Mother: Mary Chandler, MRN: [id number]; infant: Marcella “Gilma” Geraldine, MRN: [id number]. CSW was informed infant’s CPS report (Web[employer] #[id number]) was accepted. The CPS worker for infant is [email or web address]. CPS worker Za came to the hospital yesterday evening after CSW was gone, and spoke to mother, and mother’s RN. CPS worker Za informed mother and mother’s RN, infant is NOT to d/c home with mother. CPS worker Za is in the process of finding a d/c placement for infant and will update CSW on infant’s d/c plan this afternoon. CSW will follow cord. MOOK Nugent.               Discharge Codes    No documentation.                     INO Gutiérrez

## 2022-12-09 NOTE — PROGRESS NOTES
" POSTPARTUM ROUNDS    SUBJECTIVE:    CC:  POD2 from CS    Subjective:  Pt is doing well, pain is well controlled, pt is ambulating and tolerating a regular diet.  Voiding well.  Coughing still.     Review of Systems - Negative except cough  NO FEVER OR CHILLS , NO NAUSEA OR VOMITING, NO LEG PAIN    OBJECTIVE:  Vital Signs: /62 (BP Location: Right arm, Patient Position: Sitting)   Pulse 80   Temp 97 °F (36.1 °C) (Oral)   Resp 18   Ht 162.6 cm (64\")   Wt 67 kg (147 lb 12.8 oz)   LMP  (LMP Unknown)   SpO2 100%   Breastfeeding No   BMI 25.37 kg/m²     Intake/Output Summary (Last 24 hours) at 2022 1220  Last data filed at 2022 1824  Gross per 24 hour   Intake 300 ml   Output --   Net 300 ml       Constitutional: The patient is well nourished. Alert and oriented.  Mental status is upbeat, no signs postpartum depression.   Cardiovascular:RRR  Resp: nonlabored breathing  The incision is  clean, dry and Intact   Gastrointestinal: fundus firm below umbilicus  Extremities:no edema, non-tender bilateral    LABS / IMAGING:  Lab Results (last 24 hours)     ** No results found for the last 24 hours. **          ASSESSMENT AND PLAN:    POD 2 from  delivery:    Patient Active Problem List   Diagnosis   • Late prenatal care   • Previous  section   • Anxiety   • Large for dates   • Polyhydramnios in third trimester   • Polyhydramnios affecting pregnancy   •  delivery delivered   • Influenza A       Patient is postop day 2 from LTCS  Flu +- still symptomatic  Patient is doing well   Encourage ambulation     Marsha Melvin MD    2022  12:20 EST      "

## 2022-12-10 PROCEDURE — 0503F POSTPARTUM CARE VISIT: CPT | Performed by: OBSTETRICS & GYNECOLOGY

## 2022-12-10 RX ADMIN — IBUPROFEN 600 MG: 600 TABLET, FILM COATED ORAL at 12:29

## 2022-12-10 RX ADMIN — IBUPROFEN 600 MG: 600 TABLET, FILM COATED ORAL at 00:07

## 2022-12-10 RX ADMIN — IBUPROFEN 600 MG: 600 TABLET, FILM COATED ORAL at 06:21

## 2022-12-10 RX ADMIN — ACETAMINOPHEN 650 MG: 325 TABLET, FILM COATED ORAL at 16:35

## 2022-12-10 RX ADMIN — IBUPROFEN 600 MG: 600 TABLET, FILM COATED ORAL at 18:52

## 2022-12-10 RX ADMIN — ACETAMINOPHEN 650 MG: 325 TABLET, FILM COATED ORAL at 09:47

## 2022-12-10 RX ADMIN — ACETAMINOPHEN 650 MG: 325 TABLET, FILM COATED ORAL at 22:33

## 2022-12-10 RX ADMIN — ACETAMINOPHEN 650 MG: 325 TABLET, FILM COATED ORAL at 03:33

## 2022-12-10 NOTE — PROGRESS NOTES
[facility]   Obstetrics and Gynecology     12/10/2022    Name:Mary Chandler    MR#:[id number]     Progress Note:  Post-Op    HD:3    Subjective   27 y.o. yo Female  s/p CS at 38w0d doing well. Pain well controlled. Tolerating regular diet and having flatus. Lochia normal.     Patient Active Problem List   Diagnosis   • Late prenatal care   • Previous  section   • Anxiety   • Large for dates   • Polyhydramnios in third trimester   • Polyhydramnios affecting pregnancy   •  delivery delivered   • Influenza A        Objective    Vitals  Temp:  Temp:  [97.3 °F (36.3 °C)-98.2 °F (36.8 °C)] 97.8 °F (36.6 °C)  Temp src: Oral  BP:  BP: (103-120)/(70-79) 103/70  Pulse:  Heart Rate:  [71-82] 75  RR:   Resp:  [16-18] 18  Weight: 67 kg (147 lb 12.8 oz)  BMI:  Body mass index is 25.37 kg/m².    Physical Exam  Vitals and nursing note reviewed.   Constitutional:       General: She is not in acute distress.     Appearance: Normal appearance. She is well-developed. She is not ill-appearing.   HENT:      Head: Normocephalic.   Pulmonary:      Effort: Pulmonary effort is normal.   Abdominal:      General: Abdomen is flat. There is no distension.      Palpations: Abdomen is soft. There is no mass.      Tenderness: There is no abdominal tenderness.   Genitourinary:     Vagina: Normal.      Comments: Lochia is scant  Fundus is firm    Incision:  dry/clean/intact  Musculoskeletal:         General: No swelling or tenderness.   Neurological:      Mental Status: She is alert and oriented to person, place, and time.   Psychiatric:         Behavior: Behavior normal.         Thought Content: Thought content normal.         Judgment: Judgment normal.         No intake/output data recorded.    LABS:  Results from last 7 days   Lab Units 22  0520 22  0858   WBC 10*3/mm3 9.15 7.96   HEMOGLOBIN g/dL 12.7 12.2   HEMATOCRIT % 38.4 37.5   PLATELETS 10*3/mm3 184 186     Results from last  7 days   Lab Units 22  0855   SODIUM mmol/L 138   POTASSIUM mmol/L 3.6   CHLORIDE mmol/L 103   CO2 mmol/L 21.0*   BUN mg/dL 5*   CREATININE mg/dL 0.51*   CALCIUM mg/dL 8.7   BILIRUBIN mg/dL 0.3   ALK PHOS U/L 171*   ALT (SGPT) U/L <5   AST (SGOT) U/L 17   GLUCOSE mg/dL 76       Infant: female       Assessment    1.  POD#3     Previous  section    Late prenatal care    Large for dates    Polyhydramnios affecting pregnancy     delivery delivered    Influenza A      Plan:  Continue routine postoperative care     Andres Kearns MD  12/10/2022 08:11 EST

## 2022-12-10 NOTE — PROGRESS NOTES
Continued Stay Note  [facility]     Patient Name: Mary Chandler  MRN: [id number]  Today's Date: 12/10/2022    Admit Date: 12/7/2022    Plan: Per CPS, infant MAY discharge to mother when medically ready. CSW will follow cord toxicology. MOOK Cruz   Discharge Plan     Row Name 12/10/22 0911       Plan    Plan Per CPS, infant MAY discharge to mother when medically ready. CSW will follow cord toxicology. MOOK Cruz    Plan Comments Mother: Mary Chandler, MRN: [id number]; infant: Marcella “Gilma” Geraldine, MRN: [id number]. Per CPS, infant is cleared to discharge to mother when medically ready. A copy of the CPS safety plan is on infant’s chart. CSW will continue to follow cord toxicology and report results to CPS worker Summer Umaña when available. MOOK Cruz               Discharge Codes    No documentation.                     INO Guevara

## 2022-12-10 NOTE — PLAN OF CARE
Problem: Adult Inpatient Plan of Care  Goal: Plan of Care Review  Outcome: Ongoing, Progressing  Flowsheets (Taken 12/10/2022 1659)  Progress: improving  Plan of Care Reviewed With: patient  Outcome Evaluation: pt vss, pain well controlled on ERAS protocol. ambulating in room. discharge home tomorrow.  Goal: Patient-Specific Goal (Individualized)  Outcome: Ongoing, Progressing  Goal: Absence of Hospital-Acquired Illness or Injury  Outcome: Ongoing, Progressing  Intervention: Identify and Manage Fall Risk  Recent Flowsheet Documentation  Taken 12/10/2022 1635 by Alicia Gracia, RN  Safety Promotion/Fall Prevention: safety round/check completed  Goal: Optimal Comfort and Wellbeing  Outcome: Ongoing, Progressing  Intervention: Monitor Pain and Promote Comfort  Recent Flowsheet Documentation  Taken 12/10/2022 1635 by Alicia Gracia, RN  Pain Management Interventions:   see MAR   quiet environment facilitated   pain management plan reviewed with patient/caregiver  Goal: Readiness for Transition of Care  Outcome: Ongoing, Progressing     Problem: Adjustment to Role Transition (Postpartum  Delivery)  Goal: Successful Maternal Role Transition  Outcome: Ongoing, Progressing     Problem: Bleeding (Postpartum  Delivery)  Goal: Hemostasis  Outcome: Ongoing, Progressing     Problem: Infection (Postpartum  Delivery)  Goal: Absence of Infection Signs and Symptoms  Outcome: Ongoing, Progressing     Problem: Pain (Postpartum  Delivery)  Goal: Acceptable Pain Control  Outcome: Ongoing, Progressing  Intervention: Prevent or Manage Pain  Recent Flowsheet Documentation  Taken 12/10/2022 1635 by Alicia Gracia, RN  Pain Management Interventions:   see MAR   quiet environment facilitated   pain management plan reviewed with patient/caregiver     Problem: Postoperative Nausea and Vomiting (Postpartum  Delivery)  Goal: Nausea and Vomiting Relief  Outcome: Ongoing, Progressing     Problem:  Postoperative Urinary Retention (Postpartum  Delivery)  Goal: Effective Urinary Elimination  Outcome: Ongoing, Progressing     Problem: Device-Related Complication Risk (Anesthesia/Analgesia, Neuraxial)  Goal: Safe Infusion Delivery Completion  Outcome: Ongoing, Progressing     Problem: Infection (Anesthesia/Analgesia, Neuraxial)  Goal: Absence of Infection Signs and Symptoms  Outcome: Ongoing, Progressing     Problem: Nausea and Vomiting (Anesthesia/Analgesia, Neuraxial)  Goal: Nausea and Vomiting Relief  Outcome: Ongoing, Progressing     Problem: Pain (Anesthesia/Analgesia, Neuraxial)  Goal: Effective Pain Control  Outcome: Ongoing, Progressing  Intervention: Prevent or Manage Pain  Recent Flowsheet Documentation  Taken 12/10/2022 1635 by Alicia Gracia RN  Pain Management Interventions:   see MAR   quiet environment facilitated   pain management plan reviewed with patient/caregiver     Problem: Respiratory Compromise (Anesthesia/Analgesia, Neuraxial)  Goal: Effective Oxygenation and Ventilation  Outcome: Ongoing, Progressing     Problem: Sensorimotor Impairment (Anesthesia/Analgesia, Neuraxial)  Goal: Baseline Motor Function  Outcome: Ongoing, Progressing  Intervention: Optimize Sensorimotor Function  Recent Flowsheet Documentation  Taken 12/10/2022 1635 by Alicia Gracia RN  Safety Promotion/Fall Prevention: safety round/check completed     Problem: Urinary Retention (Anesthesia/Analgesia, Neuraxial)  Goal: Effective Urinary Elimination  Outcome: Ongoing, Progressing     Problem: Skin Injury Risk Increased  Goal: Skin Health and Integrity  Outcome: Ongoing, Progressing     Problem:  Fall Injury Risk  Goal: Absence of Fall, Infant Drop and Related Injury  Outcome: Ongoing, Progressing  Intervention: Promote Injury-Free Environment  Recent Flowsheet Documentation  Taken 12/10/2022 1635 by Alicia Gracia RN  Safety Promotion/Fall Prevention: safety round/check completed   Goal Outcome  Evaluation:  Plan of Care Reviewed With: patient        Progress: improving  Outcome Evaluation: pt vss, pain well controlled on ERAS protocol. ambulating in room. discharge home tomorrow.

## 2022-12-10 NOTE — PLAN OF CARE
Goal Outcome Evaluation:  Plan of Care Reviewed With: patient           Outcome Evaluation: VSS.  Pt up ad lyle and voiding without difficulty.  Fundal assessment and lochia, wnl.  Pain controlled w/scheduled motrin and tylenol.

## 2022-12-11 VITALS
WEIGHT: 147.8 LBS | SYSTOLIC BLOOD PRESSURE: 114 MMHG | BODY MASS INDEX: 25.23 KG/M2 | HEIGHT: 64 IN | HEART RATE: 73 BPM | RESPIRATION RATE: 16 BRPM | DIASTOLIC BLOOD PRESSURE: 78 MMHG | TEMPERATURE: 98.1 F | OXYGEN SATURATION: 100 %

## 2022-12-11 PROCEDURE — 0503F POSTPARTUM CARE VISIT: CPT | Performed by: OBSTETRICS & GYNECOLOGY

## 2022-12-11 RX ORDER — IBUPROFEN 800 MG/1
800 TABLET ORAL EVERY 6 HOURS PRN
Qty: 40 TABLET | Refills: 1 | Status: SHIPPED | OUTPATIENT
Start: 2022-12-11

## 2022-12-11 RX ORDER — ACETAMINOPHEN 500 MG
500 TABLET ORAL EVERY 6 HOURS PRN
Qty: 40 TABLET | Refills: 1 | Status: SHIPPED | OUTPATIENT
Start: 2022-12-11

## 2022-12-11 RX ORDER — OXYCODONE HYDROCHLORIDE 5 MG/1
5 TABLET ORAL EVERY 4 HOURS PRN
Qty: 15 TABLET | Refills: 0 | Status: SHIPPED | OUTPATIENT
Start: 2022-12-11

## 2022-12-11 RX ADMIN — ACETAMINOPHEN 650 MG: 325 TABLET, FILM COATED ORAL at 05:44

## 2022-12-11 RX ADMIN — IBUPROFEN 600 MG: 600 TABLET, FILM COATED ORAL at 08:40

## 2022-12-11 RX ADMIN — IBUPROFEN 600 MG: 600 TABLET, FILM COATED ORAL at 00:51

## 2022-12-11 NOTE — PROGRESS NOTES
2022    Name:Mary Chandler    MR#:[id number]     Progress Note:  Post-Op day 4 S/P    HD:4    Subjective   27 y.o. yo Female  s/p CS at 38w0d doing well. Pain well controlled. Tolerating regular diet and having flatus. Lochia normal.     Patient Active Problem List   Diagnosis   • Late prenatal care   • Previous  section   • Anxiety   • Large for dates   • Polyhydramnios in third trimester   • Polyhydramnios affecting pregnancy   •  delivery delivered   • Influenza A        Objective    Vitals  Temp:  Temp:  [98 °F (36.7 °C)-98.1 °F (36.7 °C)] 98.1 °F (36.7 °C)  Temp src: Oral  BP:  BP: (114-120)/(77-78) 114/78  Pulse:  Heart Rate:  [73-79] 73  RR:   Resp:  [16-20] 16  Weight: 67 kg (147 lb 12.8 oz)  BMI:  Body mass index is 25.37 kg/m².      General Awake, alert, no distress  Abdomen Soft, non-distended, fundus firm, 2 cm below umbilicus, appropriately tender  Incision  Intact, no erythema or exudate  Extremities Calves NT bilaterally         No intake/output data recorded.    LABS:   Lab Results   Component Value Date    WBC 9.15 2022    HGB 12.7 2022    HCT 38.4 2022    MCV 87.9 2022     2022       Infant: female       Assessment   1.  POD 4  2. Influenza A    Plan: Doing well. DC home today      Previous  section    Late prenatal care    Large for dates    Polyhydramnios affecting pregnancy     delivery delivered    Influenza A      Shelby Hernandez MD  2022 09:28 EST

## 2022-12-12 NOTE — PROGRESS NOTES
Continued Stay Note  [facility]     Patient Name: Mary Chandler  MRN: [id number]  Today's Date: 12/12/2022    Admit Date: 12/7/2022    Plan: Per CPS, infant MAY discharge to mother when medically ready. CSW will follow cord toxicology. MOOK rCuz   Discharge Plan     Row Name 12/12/22 0907       Plan    Plan Comments Mother: Mary Chandler, MRN: [id number]; infant: Marcella “Gilma” Geraldine, MRN: [id number]. CSW has reviewed infant’s cord toxicology results and infant’s cord was negative for substances. CSW sent a copy of infant’s cord tox results to CPS worker Summer Umaña. MOOK Nugent.               Discharge Codes    No documentation.               Expected Discharge Date and Time     Expected Discharge Date Expected Discharge Time    Dec 11, 2022             INO Gutiérrez

## 2022-12-21 PROBLEM — O40.9XX0 POLYHYDRAMNIOS AFFECTING PREGNANCY: Status: RESOLVED | Noted: 2022-12-07 | Resolved: 2022-12-21

## 2022-12-21 PROBLEM — O40.3XX0 POLYHYDRAMNIOS IN THIRD TRIMESTER: Status: RESOLVED | Noted: 2022-11-30 | Resolved: 2022-12-21

## 2022-12-21 PROBLEM — J10.1 INFLUENZA A: Status: RESOLVED | Noted: 2022-12-07 | Resolved: 2022-12-21

## 2023-01-08 PROBLEM — O09.30 LATE PRENATAL CARE: Status: RESOLVED | Noted: 2022-11-07 | Resolved: 2023-01-08

## 2023-01-08 NOTE — PROGRESS NOTES
Subjective   Mary Chandler is a 27 y.o. female who presents for a postpartum visit. She is 4 weeks postpartum following a low cervical transverse  section. I have fully reviewed the prenatal and intrapartum course. The delivery was at 38 gestational weeks. Outcome: repeat  section, low transverse incision. Anesthesia: spinal. Postpartum course has been uncomplicated. Baby's course has been normal. Baby is feeding by bottle. Bleeding staining only. Bowel function is normal. Bladder function is normal. Patient is not sexually active. Contraception method is abstinence. Postpartum depression screening: negative.    The following portions of the patient's history were reviewed and updated as appropriate: allergies, current medications, past family history, past medical history, past social history, past surgical history and problem list.    Review of Systems  Pertinent items are noted in HPI.    Objective   /68   Ht 162.6 cm (64\")   Wt 59.1 kg (130 lb 6.4 oz)   LMP  (LMP Unknown)   Breastfeeding No Comment: Formula feeding  BMI 22.38 kg/m²    General:  alert, appears stated age, cooperative and no distress    Breasts:  inspection negative, no nipple discharge or bleeding, no masses or nodularity palpable   Lungs: clear to auscultation bilaterally   Heart:  regular rate and rhythm   Abdomen: soft, nontender, incision clean/dry/intact. no surrounding erythema or swelling    Vulva:  normal   Vagina: normal vagina, no discharge, exudate, lesion, or erythema   Cervix:  no lesions   Corpus: normal size, contour, position, consistency, mobility, non-tender   Adnexa:  no mass, fullness, tenderness   Rectal Exam: Not performed.     Assessment & Plan   normal postpartum exam. Pap smear done at today's visit.    1. Contraception: abstinence for now, pt notes she is currently not seeing partner as he has relapsed and is using drugs again. Pt notes she is currently managing well and denies depression  symptoms.  2. Reviewed options for contraception. Pt currently declines ocp's or any other methods. Recommend she consider options further and provided handouts to review. Discussed importance of avoiding future pregnancies given 3 cesareans. Pt notes understanding.   3. Follow up in: 3 months or as needed.

## 2023-01-09 ENCOUNTER — POSTPARTUM VISIT (OUTPATIENT)
Dept: OBSTETRICS AND GYNECOLOGY | Age: 28
End: 2023-01-09
Payer: COMMERCIAL

## 2023-01-09 VITALS
WEIGHT: 130.4 LBS | SYSTOLIC BLOOD PRESSURE: 110 MMHG | HEIGHT: 64 IN | BODY MASS INDEX: 22.26 KG/M2 | DIASTOLIC BLOOD PRESSURE: 68 MMHG

## 2023-01-09 PROCEDURE — 0503F POSTPARTUM CARE VISIT: CPT | Performed by: OBSTETRICS & GYNECOLOGY

## 2023-01-12 LAB
CONV .: NORMAL
CYTOLOGIST CVX/VAG CYTO: NORMAL
CYTOLOGY CVX/VAG DOC CYTO: NORMAL
CYTOLOGY CVX/VAG DOC THIN PREP: NORMAL
DX ICD CODE: NORMAL
HIV 1 & 2 AB SER-IMP: NORMAL
Lab: NORMAL
OTHER STN SPEC: NORMAL
STAT OF ADQ CVX/VAG CYTO-IMP: NORMAL

## 2024-02-14 NOTE — PLAN OF CARE
Problem: Patient Care Overview (Adult)  Goal: Plan of Care Review  Outcome: Ongoing (interventions implemented as appropriate)    09/06/17 1557   Coping/Psychosocial Response Interventions   Plan Of Care Reviewed With patient   Patient Care Overview   Progress improving       Goal: Adult Individualization and Mutuality  Outcome: Ongoing (interventions implemented as appropriate)    09/06/17 1557   Individualization   Patient Specific Preferences Breastfeeding; fob to cut the cord   Patient Specific Goals healthy mom and baby   Patient Specific Interventions Social service consult RE: finances/housing   Mutuality/Individual Preferences   What Anxieties, Fears or Concerns Do You Have About Your Health or Care? none now   What Information Would Help Us Give You More Personalized Care? know POC            Quality 130: Documentation Of Current Medications In The Medical Record: Current Medications Documented Quality 111:Pneumonia Vaccination Status For Older Adults: Patient received any pneumococcal conjugate or polysaccharide vaccine on or after their 60th birthday and before the end of the measurement period Quality 431: Preventive Care And Screening: Unhealthy Alcohol Use - Screening: Patient not identified as an unhealthy alcohol user when screened for unhealthy alcohol use using a systematic screening method Detail Level: Detailed Quality 226: Preventive Care And Screening: Tobacco Use: Screening And Cessation Intervention: Patient screened for tobacco use and is an ex/non-smoker No

## (undated) DEVICE — SUT MNCRYL PLS ANTIB UD 4/0 PS2 18IN

## (undated) DEVICE — ANTIBACTERIAL UNDYED BRAIDED (POLYGLACTIN 910), SYNTHETIC ABSORBABLE SUTURE: Brand: COATED VICRYL

## (undated) DEVICE — SUT VIC 0 CT1 36IN J946H

## (undated) DEVICE — SUT MNCRYL 0/0 CTX 36IN Y398H

## (undated) DEVICE — GLV SURG BIOGEL LTX PF 6 1/2

## (undated) DEVICE — SUT MONOCRYL 4/0 PS2 27IN Y426H ETY426H

## (undated) DEVICE — SOL IRR H2O BTL 1000ML STRL

## (undated) DEVICE — 3M(TM) TEGADERM(TM) TRANSPARENT FILM DRESSING FRAME STYLE 1627: Brand: 3M™ TEGADERM™

## (undated) DEVICE — 3M™ MEDIPORE™ H SOFT CLOTH SURGICAL TAPE 2864, 4 INCH X 10 YARD (10CM X 9,14M), 12 ROLLS/CASE: Brand: 3M™ MEDIPORE™

## (undated) DEVICE — SUT VIC PLS 0 CTX 36IN UD VCP978H

## (undated) DEVICE — STPLR SKIN VISISTAT WD 35CT

## (undated) DEVICE — SUT MNCRYL 3/0 CT1 36 IN Y944H

## (undated) DEVICE — 3M™ STERI-STRIP™ REINFORCED ADHESIVE SKIN CLOSURES, R1547, 1/2 IN X 4 IN (12 MM X 100 MM), 6 STRIPS/ENVELOPE: Brand: 3M™ STERI-STRIP™